# Patient Record
Sex: FEMALE | Race: WHITE | NOT HISPANIC OR LATINO | Employment: UNEMPLOYED | URBAN - METROPOLITAN AREA
[De-identification: names, ages, dates, MRNs, and addresses within clinical notes are randomized per-mention and may not be internally consistent; named-entity substitution may affect disease eponyms.]

---
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
## 2020-01-01 PROBLEM — F41.1 GENERALIZED ANXIETY DISORDER WITH PANIC ATTACKS: Status: ACTIVE | Noted: 2020-01-01

## 2020-01-01 PROBLEM — H10.32 ACUTE CONJUNCTIVITIS OF LEFT EYE: Status: ACTIVE | Noted: 2020-01-01

## 2020-01-01 PROBLEM — F41.0 GENERALIZED ANXIETY DISORDER WITH PANIC ATTACKS: Status: ACTIVE | Noted: 2020-01-01

## 2020-01-02 NOTE — PROGRESS NOTES
" Patient with limited prenatal care to date.  Did have anatomical survery @ 23 weeks gestation with antonio pregnancy, good growth noted, normal SETH, 3V cord, cephalic, posterior placenta with cervical length of 30.4 mm.   Female.    Patient states was seen at [facility] several months back for "fainting episode where eyes rolled back and my fist clenched". No seizure hx. Was given RX for Keppra till neuro follow up (not done) as "no one takes my medicaid".   Patient NEVER started medication as "never had seizures before".  Scheduled amb consult to Neurology for follow up.    No headaches, blurred vision, LOC since last episode.   Baby active, no lof, bleeding or cramping.  Reviewed s/s PTL and PRE-E. Having none.    Addressed anxiety/panic disorder and started on Vistaril 25 mg TID prn for same.     States had "flu" injection-  Considering TDAP next ov.  Will be breastfeeding and encouraged to download c0-effective odalys.    RTC 2 wks with 28 wk labs as well.    BENEFITS OF BREASTFEEDING   Protects baby from infection   Easy on their bellies (easy to digest)   Less chance of obesity, asthma, allergies   Lower risk of serious intestine infection   Helps control moms bleeding after baby is born   Burns calories (helps mom return to her weight before pregnancy)      RISKS OF FORMULA FEEDING   Changes the number of healthy bacteria in the babys belly   More difficult to digest and can make the baby constipated   Formula can become contaminated. This could make your baby sick.      SKIN TO SKIN   Helps baby stay warm   Helps babys breathing and heart beat   Helps mom and baby bond    ROOMING IN   Teaches mom how to take care of baby   Babies cry less when they stay with mom   Gets breastfeeding off to a good start    RISK OF FORMULA SUPPLEMENTATION      Can stretch babys tummy   Lose the infection protection that breastmilk offers   Can decrease moms milk supply      Download the Cofffective odalys to your " phone  Its free!

## 2020-01-09 ENCOUNTER — LAB VISIT (OUTPATIENT)
Dept: LAB | Facility: HOSPITAL | Age: 20
End: 2020-01-09
Attending: OBSTETRICS & GYNECOLOGY
Payer: MEDICAID

## 2020-01-09 ENCOUNTER — ROUTINE PRENATAL (OUTPATIENT)
Dept: OBSTETRICS AND GYNECOLOGY | Facility: CLINIC | Age: 20
End: 2020-01-09
Payer: MEDICAID

## 2020-01-09 VITALS — SYSTOLIC BLOOD PRESSURE: 116 MMHG | DIASTOLIC BLOOD PRESSURE: 64 MMHG | WEIGHT: 154.13 LBS | BODY MASS INDEX: 27.3 KG/M2

## 2020-01-09 DIAGNOSIS — R56.9 SEIZURE-LIKE ACTIVITY: ICD-10-CM

## 2020-01-09 DIAGNOSIS — F41.0 SEVERE ANXIETY WITH PANIC: ICD-10-CM
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
## 2020-01-09 DIAGNOSIS — Z3A.26 26 WEEKS GESTATION OF PREGNANCY: ICD-10-CM

## 2020-01-09 DIAGNOSIS — Z34.83 ENCOUNTER FOR SUPERVISION OF OTHER NORMAL PREGNANCY IN THIRD TRIMESTER: ICD-10-CM

## 2020-01-09 DIAGNOSIS — Z3A.28 28 WEEKS GESTATION OF PREGNANCY: Primary | ICD-10-CM

## 2020-01-09 PROBLEM — Z34.90 SUPERVISION OF NORMAL PREGNANCY: Status: ACTIVE | Noted: 2020-01-09

## 2020-01-09 LAB — GLUCOSE SERPL-MCNC: 111 MG/DL (ref 70–140)

## 2020-01-09 PROCEDURE — 36415 COLL VENOUS BLD VENIPUNCTURE: CPT | Mod: PO

## 2020-01-09 PROCEDURE — 99213 OFFICE O/P EST LOW 20 MIN: CPT | Mod: TH,S$PBB,, | Performed by: OBSTETRICS & GYNECOLOGY

## 2020-01-09 PROCEDURE — 99999 PR PBB SHADOW E&M-EST. PATIENT-LVL II: CPT | Mod: PBBFAC,,, | Performed by: OBSTETRICS & GYNECOLOGY

## 2020-01-09 PROCEDURE — 86592 SYPHILIS TEST NON-TREP QUAL: CPT

## 2020-01-09 PROCEDURE — 99213 PR OFFICE/OUTPT VISIT, EST, LEVL III, 20-29 MIN: ICD-10-PCS | Mod: TH,S$PBB,, | Performed by: OBSTETRICS & GYNECOLOGY

## 2020-01-09 PROCEDURE — 99999 PR PBB SHADOW E&M-EST. PATIENT-LVL II: ICD-10-PCS | Mod: PBBFAC,,, | Performed by: OBSTETRICS & GYNECOLOGY

## 2020-01-09 PROCEDURE — 86703 HIV-1/HIV-2 1 RESULT ANTBDY: CPT

## 2020-01-09 PROCEDURE — 82950 GLUCOSE TEST: CPT | Mod: PO

## 2020-01-09 PROCEDURE — 99212 OFFICE O/P EST SF 10 MIN: CPT | Mod: PBBFAC,TH,PO | Performed by: OBSTETRICS & GYNECOLOGY

## 2020-01-09 RX ORDER — CEPHALEXIN 500 MG/1
500 CAPSULE ORAL 4 TIMES DAILY
Qty: 40 CAPSULE | Refills: 0 | Status: SHIPPED | OUTPATIENT
Start: 2020-01-09 | End: 2020-02-06

## 2020-01-10 LAB
HIV 1+2 AB+HIV1 P24 AG SERPL QL IA: NEGATIVE
RPR SER QL: NORMAL

## 2020-01-10 NOTE — PROGRESS NOTES
"Still has not had neurology follow up appt.   No headaches, no further episodes of ? Seizure activity.   Taking Vistaril prn for anxiety and feels "is helping".  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 Excited about same.  28 wk labs in progress.    Good fm/fh.  Reassuring FKCs.  Reviewed s/s PTL/ Pre-E- having none.  Re-iterated childbirth classes available and option to tour birthing unit when able.     Has "insect bite" on outer aspect of left arm below elbow. Area reddened, not purulent, but slightly tender to touch. ~ 1 cm in size. Will start on Keflex 500 mg TID x 10 days. Use warm, moist compresses tid. Call if no improvement in 48 hours. Keep site covered.     RTC 2 wks.     "

## 2020-01-23 ENCOUNTER — ROUTINE PRENATAL (OUTPATIENT)
Dept: OBSTETRICS AND GYNECOLOGY | Facility: CLINIC | Age: 20
End: 2020-01-23
Payer: MEDICAID

## 2020-01-23 VITALS
DIASTOLIC BLOOD PRESSURE: 68 MMHG | WEIGHT: 158.94 LBS | SYSTOLIC BLOOD PRESSURE: 126 MMHG | BODY MASS INDEX: 28.16 KG/M2

## 2020-01-23 DIAGNOSIS — F41.0 PANIC ATTACK AS REACTION TO STRESS: ICD-10-CM

## 2020-01-23 DIAGNOSIS — F43.0 PANIC ATTACK AS REACTION TO STRESS: ICD-10-CM

## 2020-01-23 DIAGNOSIS — R56.9 SEIZURE-LIKE ACTIVITY: Primary | ICD-10-CM

## 2020-01-23 PROCEDURE — 99213 OFFICE O/P EST LOW 20 MIN: CPT | Mod: TH,S$PBB,, | Performed by: OBSTETRICS & GYNECOLOGY

## 2020-01-23 PROCEDURE — 99999 PR PBB SHADOW E&M-EST. PATIENT-LVL III: ICD-10-PCS | Mod: PBBFAC,,, | Performed by: OBSTETRICS & GYNECOLOGY

## 2020-01-23 PROCEDURE — 99213 PR OFFICE/OUTPT VISIT, EST, LEVL III, 20-29 MIN: ICD-10-PCS | Mod: TH,S$PBB,, | Performed by: OBSTETRICS & GYNECOLOGY

## 2020-01-23 PROCEDURE — 99213 OFFICE O/P EST LOW 20 MIN: CPT | Mod: PBBFAC,PO | Performed by: OBSTETRICS & GYNECOLOGY

## 2020-01-23 PROCEDURE — 99999 PR PBB SHADOW E&M-EST. PATIENT-LVL III: CPT | Mod: PBBFAC,,, | Performed by: OBSTETRICS & GYNECOLOGY

## 2020-01-23 RX ORDER — HYDROXYZINE PAMOATE 25 MG/1
50 CAPSULE ORAL 3 TIMES DAILY PRN
Qty: 90 CAPSULE | Refills: 2 | Status: ON HOLD | OUTPATIENT
Start: 2020-01-23 | End: 2020-04-06 | Stop reason: HOSPADM

## 2020-01-23 NOTE — PROGRESS NOTES
"Trying to get patient in with mother's own neurologist for f/u to ? Seizure noted at start of pregnancy.  No prior hx.  No further episodes noted. Placed referral order again.   Patient has started school and states stress level has increased. Feels like Vistaril not as effective anymore.  Dosage increased to 50 mg po TID. Does use just prn and feels overall "better" on medication.  Left elbow insect bite healed/dried. No drainage/tenderness to site.  Did complete all abx.   28 wk labs all normal and shared with patient.     Reviewed all s/s Pre-e/ PTL. Experiencing none.  Baby very active.  Good fm/fh.   No lof, bleeding or cramping.  Doing well.     Patient viewed [email or web address] video, Learn Your Baby and was provided with [employer] handouts: Baby Led Feeding and Rooming In. Discussed benefits of rooming-in 24 hours a day, benefits of cue-based feeding, the impact of feeding frequency on milk supply, and basic breastfeeding management. Encouraged patient to attend [facility]s Prenatal Breastfeeding Class and to download the tracx mobile odalys if she has not already done so. Patient verbalizes understanding.     RTC 2 wks.       "

## 2020-01-31 ENCOUNTER — PATIENT MESSAGE (OUTPATIENT)
Dept: OBSTETRICS AND GYNECOLOGY | Facility: CLINIC | Age: 20
End: 2020-01-31

## 2020-02-06 ENCOUNTER — ROUTINE PRENATAL (OUTPATIENT)
Dept: OBSTETRICS AND GYNECOLOGY | Facility: CLINIC | Age: 20
End: 2020-02-06
Payer: MEDICAID

## 2020-02-06 ENCOUNTER — PATIENT MESSAGE (OUTPATIENT)
Dept: OBSTETRICS AND GYNECOLOGY | Facility: CLINIC | Age: 20
End: 2020-02-06

## 2020-02-06 VITALS
WEIGHT: 162.69 LBS | DIASTOLIC BLOOD PRESSURE: 76 MMHG | BODY MASS INDEX: 28.82 KG/M2 | SYSTOLIC BLOOD PRESSURE: 132 MMHG

## 2020-02-06 DIAGNOSIS — Z3A.32 32 WEEKS GESTATION OF PREGNANCY: Primary | ICD-10-CM

## 2020-02-06 DIAGNOSIS — O99.351 SEIZURE DISORDER IN PREGNANCY, ANTEPARTUM, FIRST TRIMESTER: ICD-10-CM

## 2020-02-06 DIAGNOSIS — J02.9 ACUTE SORE THROAT: ICD-10-CM

## 2020-02-06 DIAGNOSIS — Z86.59 HISTORY OF ANXIETY DISORDER: ICD-10-CM

## 2020-02-06 DIAGNOSIS — G40.909 SEIZURE DISORDER IN PREGNANCY, ANTEPARTUM, FIRST TRIMESTER: ICD-10-CM

## 2020-02-06 DIAGNOSIS — J06.9 UPPER RESPIRATORY TRACT INFECTION, UNSPECIFIED TYPE: ICD-10-CM

## 2020-02-06 DIAGNOSIS — Z34.83 ENCOUNTER FOR SUPERVISION OF OTHER NORMAL PREGNANCY IN THIRD TRIMESTER: ICD-10-CM

## 2020-02-06 PROCEDURE — 99214 PR OFFICE/OUTPT VISIT, EST, LEVL IV, 30-39 MIN: ICD-10-PCS | Mod: TH,S$PBB,, | Performed by: OBSTETRICS & GYNECOLOGY

## 2020-02-06 PROCEDURE — 99213 OFFICE O/P EST LOW 20 MIN: CPT | Mod: PBBFAC,TH,PO | Performed by: OBSTETRICS & GYNECOLOGY

## 2020-02-06 PROCEDURE — 99999 PR PBB SHADOW E&M-EST. PATIENT-LVL III: CPT | Mod: PBBFAC,,, | Performed by: OBSTETRICS & GYNECOLOGY

## 2020-02-06 PROCEDURE — 99214 OFFICE O/P EST MOD 30 MIN: CPT | Mod: TH,S$PBB,, | Performed by: OBSTETRICS & GYNECOLOGY

## 2020-02-06 PROCEDURE — 99999 PR PBB SHADOW E&M-EST. PATIENT-LVL III: ICD-10-PCS | Mod: PBBFAC,,, | Performed by: OBSTETRICS & GYNECOLOGY

## 2020-02-07 ENCOUNTER — PATIENT MESSAGE (OUTPATIENT)
Dept: OBSTETRICS AND GYNECOLOGY | Facility: CLINIC | Age: 20
End: 2020-02-07

## 2020-02-07 DIAGNOSIS — R05.9 COUGH: Primary | ICD-10-CM

## 2020-02-07 RX ORDER — BENZONATATE 100 MG/1
100 CAPSULE ORAL 3 TIMES DAILY PRN
Qty: 30 CAPSULE | Refills: 0 | Status: SHIPPED | OUTPATIENT
Start: 2020-02-07 | End: 2020-02-17

## 2020-02-08 NOTE — PROGRESS NOTES
"States still doing well on Vistaril and has been very helpful in managing anxiety/panic attacks.   Ambulatory consult to Neuro with appt still pending.   No further episodes of ? Seizure at start of pregnancy.   Good fm/fh.  4 # wt gain. TWG 37 #.   Minimal edema.   No headaches, blurred vision or any  ctx's.   EFW by Leopolds' 4#, but possible breech.   US next ov to confirm presentation and check growth.   Is having URI s/s. No fever. Congestion with persistent cough. Chest is CTX throughout.   Feels aches/ headache/ c/o "severe sore throat". Jack-pharynx mildly pink with no exudate.  Nasal and throat swab. Strep/ Flu swabs in office are negative.   Will send in Tessalon Perles for cough.  Continue OTC tylenol as directed.  RTC 2 wks.       "

## 2020-02-17 ENCOUNTER — PATIENT MESSAGE (OUTPATIENT)
Dept: OBSTETRICS AND GYNECOLOGY | Facility: CLINIC | Age: 20
End: 2020-02-17

## 2020-02-18 ENCOUNTER — HOSPITAL ENCOUNTER (INPATIENT)
Facility: HOSPITAL | Age: 20
LOS: 3 days | Discharge: HOME | DRG: 831 | End: 2020-02-21
Attending: FAMILY MEDICINE | Admitting: OBSTETRICS & GYNECOLOGY
Payer: MEDICAID

## 2020-02-18 DIAGNOSIS — N30.01 ACUTE CYSTITIS WITH HEMATURIA: ICD-10-CM

## 2020-02-18 DIAGNOSIS — R10.2 PELVIC PAIN AFFECTING PREGNANCY IN THIRD TRIMESTER, ANTEPARTUM: ICD-10-CM

## 2020-02-18 DIAGNOSIS — O36.8131 DECREASED FETAL MOVEMENTS IN THIRD TRIMESTER, FETUS 1 OF MULTIPLE GESTATION: ICD-10-CM

## 2020-02-18 DIAGNOSIS — R10.9 RIGHT FLANK PAIN: ICD-10-CM

## 2020-02-18 DIAGNOSIS — R00.0 TACHYCARDIA: ICD-10-CM

## 2020-02-18 DIAGNOSIS — A41.9 SEPSIS, DUE TO UNSPECIFIED ORGANISM, UNSPECIFIED WHETHER ACUTE ORGAN DYSFUNCTION PRESENT: Primary | ICD-10-CM

## 2020-02-18 DIAGNOSIS — R65.10 SIRS (SYSTEMIC INFLAMMATORY RESPONSE SYNDROME): ICD-10-CM

## 2020-02-18 DIAGNOSIS — O26.893 PELVIC PAIN AFFECTING PREGNANCY IN THIRD TRIMESTER, ANTEPARTUM: ICD-10-CM

## 2020-02-18 DIAGNOSIS — R07.9 CHEST PAIN: ICD-10-CM

## 2020-02-18 DIAGNOSIS — N12 PYELONEPHRITIS: ICD-10-CM

## 2020-02-18 PROBLEM — H10.32 ACUTE CONJUNCTIVITIS OF LEFT EYE: Status: RESOLVED | Noted: 2020-01-01 | Resolved: 2020-02-18

## 2020-02-18 PROBLEM — O36.8190 DECREASED FETAL MOVEMENT: Status: ACTIVE | Noted: 2020-02-18

## 2020-02-18 PROBLEM — O36.8190 DECREASED FETAL MOVEMENT: Status: RESOLVED | Noted: 2020-02-18 | Resolved: 2020-02-18

## 2020-02-18 PROBLEM — O47.00 PREMATURE UTERINE CONTRACTIONS: Status: ACTIVE | Noted: 2020-02-18

## 2020-02-18 LAB
ALBUMIN SERPL BCP-MCNC: 2.7 G/DL (ref 3.5–5.2)
ALP SERPL-CCNC: 121 U/L (ref 55–135)
ALT SERPL W/O P-5'-P-CCNC: 15 U/L (ref 10–44)
ANION GAP SERPL CALC-SCNC: 10 MMOL/L (ref 8–16)
APTT BLDCRRT: 25.2 SEC (ref 21–32)
AST SERPL-CCNC: 18 U/L (ref 10–40)
BACTERIA #/AREA URNS AUTO: ABNORMAL /HPF
BASOPHILS # BLD AUTO: 0.02 K/UL (ref 0–0.2)
BASOPHILS NFR BLD: 0.3 % (ref 0–1.9)
BILIRUB SERPL-MCNC: 0.5 MG/DL (ref 0.1–1)
BILIRUB UR QL STRIP: NEGATIVE
BUN SERPL-MCNC: 6 MG/DL (ref 6–20)
CALCIUM SERPL-MCNC: 8.8 MG/DL (ref 8.7–10.5)
CHLORIDE SERPL-SCNC: 105 MMOL/L (ref 95–110)
CLARITY UR REFRACT.AUTO: ABNORMAL
CO2 SERPL-SCNC: 21 MMOL/L (ref 23–29)
COLOR UR AUTO: YELLOW
CREAT SERPL-MCNC: 0.7 MG/DL (ref 0.5–1.4)
DIFFERENTIAL METHOD: ABNORMAL
EOSINOPHIL # BLD AUTO: 0.1 K/UL (ref 0–0.5)
EOSINOPHIL NFR BLD: 1.3 % (ref 0–8)
ERYTHROCYTE [DISTWIDTH] IN BLOOD BY AUTOMATED COUNT: 12.3 % (ref 11.5–14.5)
EST. GFR  (AFRICAN AMERICAN): >60 ML/MIN/1.73 M^2
EST. GFR  (NON AFRICAN AMERICAN): >60 ML/MIN/1.73 M^2
GLUCOSE SERPL-MCNC: 105 MG/DL (ref 70–110)
GLUCOSE UR QL STRIP: NEGATIVE
HCT VFR BLD AUTO: 28.8 % (ref 37–48.5)
HGB BLD-MCNC: 9.6 G/DL (ref 12–16)
HGB UR QL STRIP: ABNORMAL
IMM GRANULOCYTES # BLD AUTO: 0.03 K/UL (ref 0–0.04)
IMM GRANULOCYTES NFR BLD AUTO: 0.4 % (ref 0–0.5)
INR PPP: 1 (ref 0.8–1.2)
KETONES UR QL STRIP: NEGATIVE
LACTATE SERPL-SCNC: 2.1 MMOL/L (ref 0.5–2.2)
LEUKOCYTE ESTERASE UR QL STRIP: ABNORMAL
LIPASE SERPL-CCNC: 19 U/L (ref 4–60)
LYMPHOCYTES # BLD AUTO: 0.9 K/UL (ref 1–4.8)
LYMPHOCYTES NFR BLD: 12.3 % (ref 18–48)
MCH RBC QN AUTO: 28.9 PG (ref 27–31)
MCHC RBC AUTO-ENTMCNC: 33.3 G/DL (ref 32–36)
MCV RBC AUTO: 87 FL (ref 82–98)
MICROSCOPIC COMMENT: ABNORMAL
MONOCYTES # BLD AUTO: 0.2 K/UL (ref 0.3–1)
MONOCYTES NFR BLD: 2.1 % (ref 4–15)
NEUTROPHILS # BLD AUTO: 6.3 K/UL (ref 1.8–7.7)
NEUTROPHILS NFR BLD: 83.6 % (ref 38–73)
NITRITE UR QL STRIP: POSITIVE
NRBC BLD-RTO: 0 /100 WBC
PH UR STRIP: 7 [PH] (ref 5–8)
PLATELET # BLD AUTO: 247 K/UL (ref 150–350)
PMV BLD AUTO: 11.1 FL (ref 9.2–12.9)
POTASSIUM SERPL-SCNC: 3.8 MMOL/L (ref 3.5–5.1)
PROCALCITONIN SERPL IA-MCNC: 0.59 NG/ML
PROT SERPL-MCNC: 6.9 G/DL (ref 6–8.4)
PROT UR QL STRIP: ABNORMAL
PROTHROMBIN TIME: 10 SEC (ref 9–12.5)
RBC # BLD AUTO: 3.32 M/UL (ref 4–5.4)
RBC #/AREA URNS AUTO: 1 /HPF (ref 0–4)
SODIUM SERPL-SCNC: 136 MMOL/L (ref 136–145)
SP GR UR STRIP: 1.01 (ref 1–1.03)
SQUAMOUS #/AREA URNS AUTO: 0 /HPF
URN SPEC COLLECT METH UR: ABNORMAL
UROBILINOGEN UR STRIP-ACNC: ABNORMAL EU/DL
WBC # BLD AUTO: 7.58 K/UL (ref 3.9–12.7)
WBC #/AREA URNS AUTO: >100 /HPF (ref 0–5)

## 2020-02-18 PROCEDURE — G0378 HOSPITAL OBSERVATION PER HR: HCPCS | Mod: ER

## 2020-02-18 PROCEDURE — 87186 SC STD MICRODIL/AGAR DIL: CPT

## 2020-02-18 PROCEDURE — 83690 ASSAY OF LIPASE: CPT | Mod: PO,ER

## 2020-02-18 PROCEDURE — 84145 PROCALCITONIN (PCT): CPT | Mod: PO,ER

## 2020-02-18 PROCEDURE — 11000001 HC ACUTE MED/SURG PRIVATE ROOM

## 2020-02-18 PROCEDURE — 63600175 PHARM REV CODE 636 W HCPCS: Performed by: OBSTETRICS & GYNECOLOGY

## 2020-02-18 PROCEDURE — 96365 THER/PROPH/DIAG IV INF INIT: CPT

## 2020-02-18 PROCEDURE — 96360 HYDRATION IV INFUSION INIT: CPT

## 2020-02-18 PROCEDURE — 99291 CRITICAL CARE FIRST HOUR: CPT | Mod: 25,ER

## 2020-02-18 PROCEDURE — 96372 THER/PROPH/DIAG INJ SC/IM: CPT

## 2020-02-18 PROCEDURE — 85730 THROMBOPLASTIN TIME PARTIAL: CPT | Mod: PO,ER

## 2020-02-18 PROCEDURE — 93005 ELECTROCARDIOGRAM TRACING: CPT | Mod: PO,ER

## 2020-02-18 PROCEDURE — 59025 FETAL NON-STRESS TEST: CPT

## 2020-02-18 PROCEDURE — 87088 URINE BACTERIA CULTURE: CPT

## 2020-02-18 PROCEDURE — 80053 COMPREHEN METABOLIC PANEL: CPT | Mod: PO,ER

## 2020-02-18 PROCEDURE — 96374 THER/PROPH/DIAG INJ IV PUSH: CPT | Mod: ER

## 2020-02-18 PROCEDURE — 87077 CULTURE AEROBIC IDENTIFY: CPT

## 2020-02-18 PROCEDURE — 63600175 PHARM REV CODE 636 W HCPCS: Mod: ER | Performed by: FAMILY MEDICINE

## 2020-02-18 PROCEDURE — 81000 URINALYSIS NONAUTO W/SCOPE: CPT | Mod: PO,ER

## 2020-02-18 PROCEDURE — 25000003 PHARM REV CODE 250: Mod: ER | Performed by: FAMILY MEDICINE

## 2020-02-18 PROCEDURE — 63600175 PHARM REV CODE 636 W HCPCS: Performed by: ADVANCED PRACTICE MIDWIFE

## 2020-02-18 PROCEDURE — 87086 URINE CULTURE/COLONY COUNT: CPT

## 2020-02-18 PROCEDURE — 96361 HYDRATE IV INFUSION ADD-ON: CPT | Mod: ER

## 2020-02-18 PROCEDURE — 83605 ASSAY OF LACTIC ACID: CPT | Mod: PO,ER

## 2020-02-18 PROCEDURE — 85610 PROTHROMBIN TIME: CPT | Mod: PO,ER

## 2020-02-18 PROCEDURE — 93010 EKG 12-LEAD: ICD-10-PCS | Mod: ,,, | Performed by: INTERNAL MEDICINE

## 2020-02-18 PROCEDURE — 99211 OFF/OP EST MAY X REQ PHY/QHP: CPT | Mod: 25

## 2020-02-18 PROCEDURE — 96361 HYDRATE IV INFUSION ADD-ON: CPT

## 2020-02-18 PROCEDURE — 85025 COMPLETE CBC W/AUTO DIFF WBC: CPT | Mod: PO,ER

## 2020-02-18 PROCEDURE — 87081 CULTURE SCREEN ONLY: CPT

## 2020-02-18 PROCEDURE — 87040 BLOOD CULTURE FOR BACTERIA: CPT | Mod: 59

## 2020-02-18 PROCEDURE — 93010 ELECTROCARDIOGRAM REPORT: CPT | Mod: ,,, | Performed by: INTERNAL MEDICINE

## 2020-02-18 PROCEDURE — 25000003 PHARM REV CODE 250: Performed by: ADVANCED PRACTICE MIDWIFE

## 2020-02-18 RX ORDER — ACETAMINOPHEN 500 MG
500 TABLET ORAL EVERY 6 HOURS PRN
Status: DISCONTINUED | OUTPATIENT
Start: 2020-02-18 | End: 2020-02-18 | Stop reason: SDUPTHER

## 2020-02-18 RX ORDER — ACETAMINOPHEN 325 MG/1
650 TABLET ORAL EVERY 6 HOURS PRN
Status: DISCONTINUED | OUTPATIENT
Start: 2020-02-18 | End: 2020-02-21 | Stop reason: HOSPADM

## 2020-02-18 RX ORDER — ONDANSETRON 8 MG/1
8 TABLET, ORALLY DISINTEGRATING ORAL EVERY 8 HOURS PRN
Status: DISCONTINUED | OUTPATIENT
Start: 2020-02-18 | End: 2020-02-21 | Stop reason: HOSPADM

## 2020-02-18 RX ORDER — HYDROCODONE BITARTRATE AND ACETAMINOPHEN 7.5; 325 MG/1; MG/1
1 TABLET ORAL EVERY 6 HOURS PRN
Status: DISCONTINUED | OUTPATIENT
Start: 2020-02-18 | End: 2020-02-19

## 2020-02-18 RX ORDER — ONDANSETRON 2 MG/ML
4 INJECTION INTRAMUSCULAR; INTRAVENOUS
Status: COMPLETED | OUTPATIENT
Start: 2020-02-18 | End: 2020-02-18

## 2020-02-18 RX ORDER — BUTALBITAL, ACETAMINOPHEN AND CAFFEINE 50; 325; 40 MG/1; MG/1; MG/1
1 TABLET ORAL EVERY 4 HOURS PRN
Status: DISCONTINUED | OUTPATIENT
Start: 2020-02-18 | End: 2020-02-21 | Stop reason: HOSPADM

## 2020-02-18 RX ORDER — SODIUM CHLORIDE, SODIUM LACTATE, POTASSIUM CHLORIDE, CALCIUM CHLORIDE 600; 310; 30; 20 MG/100ML; MG/100ML; MG/100ML; MG/100ML
INJECTION, SOLUTION INTRAVENOUS CONTINUOUS
Status: DISCONTINUED | OUTPATIENT
Start: 2020-02-18 | End: 2020-02-21 | Stop reason: HOSPADM

## 2020-02-18 RX ORDER — MORPHINE SULFATE 10 MG/ML
10 INJECTION INTRAMUSCULAR; INTRAVENOUS; SUBCUTANEOUS ONCE
Status: COMPLETED | OUTPATIENT
Start: 2020-02-18 | End: 2020-02-18

## 2020-02-18 RX ORDER — HYDROXYZINE HYDROCHLORIDE 25 MG/ML
25 INJECTION, SOLUTION INTRAMUSCULAR ONCE
Status: COMPLETED | OUTPATIENT
Start: 2020-02-18 | End: 2020-02-18

## 2020-02-18 RX ORDER — HYDROXYZINE PAMOATE 25 MG/1
50 CAPSULE ORAL 3 TIMES DAILY
Status: DISCONTINUED | OUTPATIENT
Start: 2020-02-18 | End: 2020-02-21 | Stop reason: HOSPADM

## 2020-02-18 RX ORDER — ACETAMINOPHEN 325 MG/1
650 TABLET ORAL
Status: COMPLETED | OUTPATIENT
Start: 2020-02-18 | End: 2020-02-18

## 2020-02-18 RX ADMIN — ACETAMINOPHEN 650 MG: 325 TABLET ORAL at 10:02

## 2020-02-18 RX ADMIN — ACETAMINOPHEN 650 MG: 325 TABLET ORAL at 06:02

## 2020-02-18 RX ADMIN — HYDROXYZINE PAMOATE 50 MG: 25 CAPSULE ORAL at 06:02

## 2020-02-18 RX ADMIN — SODIUM CHLORIDE, SODIUM LACTATE, POTASSIUM CHLORIDE, AND CALCIUM CHLORIDE 1000 ML: .6; .31; .03; .02 INJECTION, SOLUTION INTRAVENOUS at 04:02

## 2020-02-18 RX ADMIN — BUTALBITAL, ACETAMINOPHEN AND CAFFEINE 1 TABLET: 50; 325; 40 TABLET ORAL at 08:02

## 2020-02-18 RX ADMIN — ONDANSETRON 4 MG: 2 INJECTION INTRAMUSCULAR; INTRAVENOUS at 04:02

## 2020-02-18 RX ADMIN — MORPHINE SULFATE 10 MG: 10 INJECTION, SOLUTION INTRAMUSCULAR; INTRAVENOUS at 07:02

## 2020-02-18 RX ADMIN — HYDROXYZINE HYDROCHLORIDE 25 MG: 25 INJECTION, SOLUTION INTRAMUSCULAR at 07:02

## 2020-02-18 RX ADMIN — SODIUM CHLORIDE, SODIUM LACTATE, POTASSIUM CHLORIDE, AND CALCIUM CHLORIDE: .6; .31; .03; .02 INJECTION, SOLUTION INTRAVENOUS at 06:02

## 2020-02-18 RX ADMIN — ACETAMINOPHEN 650 MG: 325 TABLET ORAL at 05:02

## 2020-02-18 RX ADMIN — SODIUM CHLORIDE, SODIUM LACTATE, POTASSIUM CHLORIDE, AND CALCIUM CHLORIDE: .6; .31; .03; .02 INJECTION, SOLUTION INTRAVENOUS at 10:02

## 2020-02-18 RX ADMIN — SODIUM CHLORIDE, SODIUM LACTATE, POTASSIUM CHLORIDE, AND CALCIUM CHLORIDE 500 ML: .6; .31; .03; .02 INJECTION, SOLUTION INTRAVENOUS at 05:02

## 2020-02-18 RX ADMIN — SODIUM CHLORIDE 1000 ML: 0.9 INJECTION, SOLUTION INTRAVENOUS at 05:02

## 2020-02-18 RX ADMIN — CEFTRIAXONE SODIUM 1 G: 1 INJECTION, POWDER, FOR SOLUTION INTRAMUSCULAR; INTRAVENOUS at 10:02

## 2020-02-18 RX ADMIN — PIPERACILLIN AND TAZOBACTAM 4.5 G: 4; .5 INJECTION, POWDER, LYOPHILIZED, FOR SOLUTION INTRAVENOUS; PARENTERAL at 05:02

## 2020-02-18 RX ADMIN — SODIUM CHLORIDE, SODIUM LACTATE, POTASSIUM CHLORIDE, AND CALCIUM CHLORIDE: .6; .31; .03; .02 INJECTION, SOLUTION INTRAVENOUS at 05:02

## 2020-02-18 RX ADMIN — SODIUM CHLORIDE, SODIUM LACTATE, POTASSIUM CHLORIDE, AND CALCIUM CHLORIDE 1000 ML: .6; .31; .03; .02 INJECTION, SOLUTION INTRAVENOUS at 03:02

## 2020-02-18 NOTE — ED TRIAGE NOTES
Patient presents to ED via POV with c/o RLQ pain, vomiting and lethargy x1 day.  States due date 03/31/2020

## 2020-02-18 NOTE — ED PROVIDER NOTES
"Encounter Date: 2020       History     Chief Complaint   Patient presents with    Emesis During Pregnancy    Abdominal Pain     RLQ     This is a 19-year-old  female  at 34 weeks gestation who presents emergency department for generalized fatigue/weakness, hematemesis, and bilateral pelvic and right flank pain. Patient states that she has been vomiting continuously for the past 2-3 days.  However prior to her arrival, family member at bedside states that she had "a lot of blood" were which she was not able to quantify.  Patient states that she has been having right-sided/flank pain since yesterday, for which she had called her midwife for further recommendation.  She was told that it was round ligament pain and recommended supportive management.  However patient states that it had gotten worse throughout the day as well as all day today and it spread to bilateral pelvic region.  Patient denies any dysuria, urgency, hematuria.  Denies history of kidney stones.  Denies any vaginal bleeding, discharge, loss of fluid/gush of fluid.  Patient does state however that she was not able to feel baby move for the past 45-60 minutes.  Denies any complications throughout prenatal care.  Patient denies diarrhea or constipation.  Denies fevers chills  Upon arrival to the emergency department, patient was unable to get out of the wheelchair and had to be assisted into the ER and into ED bed.  Patient was responsive to verbal stimuli and able to answer questions but was found to be tachycardic in the 140s.        Review of patient's allergies indicates:  No Known Allergies  Past Medical History:   Diagnosis Date    Seizure     SVT (supraventricular tachycardia)      History reviewed. No pertinent surgical history.  Family History   Problem Relation Age of Onset    Breast cancer Neg Hx     Colon cancer Neg Hx     Ovarian cancer Neg Hx      Social History     Tobacco Use    Smoking status: Never Smoker    " Smokeless tobacco: Never Used   Substance Use Topics    Alcohol use: No     Frequency: Never    Drug use: No     Review of Systems   Constitutional: Positive for activity change and fatigue. Negative for chills, diaphoresis and fever.   HENT: Negative for congestion, postnasal drip, rhinorrhea, sneezing and sore throat.    Eyes: Negative for visual disturbance.   Respiratory: Negative for cough, chest tightness and shortness of breath.    Cardiovascular: Negative for chest pain, palpitations and leg swelling.   Gastrointestinal: Positive for abdominal pain, nausea and vomiting. Negative for constipation and diarrhea.        Hematemesis  Decreased fetal movement   Genitourinary: Positive for flank pain and pelvic pain. Negative for dysuria, frequency, hematuria, urgency, vaginal bleeding and vaginal discharge.   Musculoskeletal: Negative for back pain, gait problem and myalgias.   Skin: Negative for rash.   Neurological: Positive for weakness. Negative for light-headedness, numbness and headaches.   Hematological: Does not bruise/bleed easily.   All other systems reviewed and are negative.      Physical Exam     Initial Vitals [02/18/20 0335]   BP Pulse Resp Temp SpO2   (!) 108/59 (!) 143 (!) 26 99.2 °F (37.3 °C) 100 %      MAP       --         Vitals:    02/18/20 0431 02/18/20 0446 02/18/20 0501 02/18/20 0511   BP: (!) 96/51 (!) 94/52 (!) 86/46 (!) 102/50   Pulse: 103 97 99 102   Resp: (!) 26 (!) 21 (!) 23 (!) 27   Temp:       TempSrc:       SpO2: 98% 98% 98% 100%   Weight:       Height:        02/18/20 0516   BP: (!) 107/54   Pulse: 100   Resp: (!) 27   Temp:    TempSrc:    SpO2: 100%   Weight:    Height:        Physical Exam    Nursing note and vitals reviewed.  Constitutional: She is not diaphoretic. She appears distressed.   Very weak, drowsy   HENT:   Head: Normocephalic.   Right Ear: External ear normal.   Left Ear: External ear normal.   Mouth/Throat: Oropharynx is clear and moist.   Dry mucous membranes    Eyes: Conjunctivae and EOM are normal. Pupils are equal, round, and reactive to light.   Neck: Normal range of motion. Neck supple. No JVD present.   Cardiovascular: Regular rhythm and normal heart sounds.   Tachycardic   Pulmonary/Chest: Breath sounds normal. No respiratory distress. She has no wheezes.   Abdominal: Soft. Bowel sounds are normal. She exhibits no distension. There is tenderness. There is no rebound and no guarding.   Tenderness to palpation along right side of abdomen as well as along right round ligament.  Difficult to distinguish between abdominal versus uterine pain   Genitourinary:   Genitourinary Comments: Gravid uterus  Negative CVA tenderness bilaterally, negative suprapubic tenderness  Cervix:  Closed/thick/high   Musculoskeletal: Normal range of motion. She exhibits no edema or tenderness.   Neurological: She is alert and oriented to person, place, and time. She has normal strength. No cranial nerve deficit or sensory deficit. GCS score is 15. GCS eye subscore is 4. GCS verbal subscore is 5. GCS motor subscore is 6.   Skin: Skin is warm and dry. Capillary refill takes 2 to 3 seconds. No rash noted.   Psychiatric: She has a normal mood and affect. Thought content normal.         ED Course   Critical Care  Date/Time: 2/18/2020 5:53 AM  Performed by: Naty Hall MD  Authorized by: Naty Hall MD   Direct patient critical care time: 15 minutes  Additional history critical care time: 5 minutes  Ordering / reviewing critical care time: 5 minutes  Documentation critical care time: 5 minutes  Consulting other physicians critical care time: 5 minutes  Consult with family critical care time: 5 minutes  Other critical care time: 5 minutes  Total critical care time (exclusive of procedural time) : 45 minutes  Critical care time was exclusive of separately billable procedures and treating other patients and teaching time.  Critical care was necessary to treat or prevent imminent or  life-threatening deterioration of the following conditions: sepsis.  Critical care was time spent personally by me on the following activities: blood draw for specimens, development of treatment plan with patient or surrogate, discussions with consultants, interpretation of cardiac output measurements, evaluation of patient's response to treatment, examination of patient, obtaining history from patient or surrogate, ordering and performing treatments and interventions, ordering and review of laboratory studies, ordering and review of radiographic studies, pulse oximetry, re-evaluation of patient's condition and review of old charts.        Labs Reviewed   CBC W/ AUTO DIFFERENTIAL - Abnormal; Notable for the following components:       Result Value    RBC 3.32 (*)     Hemoglobin 9.6 (*)     Hematocrit 28.8 (*)     Lymph # 0.9 (*)     Mono # 0.2 (*)     Gran% 83.6 (*)     Lymph% 12.3 (*)     Mono% 2.1 (*)     All other components within normal limits   COMPREHENSIVE METABOLIC PANEL - Abnormal; Notable for the following components:    CO2 21 (*)     Albumin 2.7 (*)     All other components within normal limits   URINALYSIS, REFLEX TO URINE CULTURE - Abnormal; Notable for the following components:    Appearance, UA Hazy (*)     Protein, UA Trace (*)     Occult Blood UA 1+ (*)     Nitrite, UA Positive (*)     Urobilinogen, UA 2.0-3.0 (*)     Leukocytes, UA 3+ (*)     All other components within normal limits    Narrative:     Preferred Collection Type->Urine, Clean Catch   PROCALCITONIN - Abnormal; Notable for the following components:    Procalcitonin 0.59 (*)     All other components within normal limits   URINALYSIS MICROSCOPIC - Abnormal; Notable for the following components:    WBC, UA >100 (*)     Bacteria Many (*)     All other components within normal limits    Narrative:     Preferred Collection Type->Urine, Clean Catch   CULTURE, BLOOD   CULTURE, BLOOD   CULTURE, URINE   LIPASE   APTT   PROTIME-INR   LACTIC  ACID, PLASMA           Results for orders placed or performed during the hospital encounter of 02/18/20   CBC W/ AUTO DIFFERENTIAL   Result Value Ref Range    WBC 7.58 3.90 - 12.70 K/uL    RBC 3.32 (L) 4.00 - 5.40 M/uL    Hemoglobin 9.6 (L) 12.0 - 16.0 g/dL    Hematocrit 28.8 (L) 37.0 - 48.5 %    Mean Corpuscular Volume 87 82 - 98 fL    Mean Corpuscular Hemoglobin 28.9 27.0 - 31.0 pg    Mean Corpuscular Hemoglobin Conc 33.3 32.0 - 36.0 g/dL    RDW 12.3 11.5 - 14.5 %    Platelets 247 150 - 350 K/uL    MPV 11.1 9.2 - 12.9 fL    Immature Granulocytes 0.4 0.0 - 0.5 %    Gran # (ANC) 6.3 1.8 - 7.7 K/uL    Immature Grans (Abs) 0.03 0.00 - 0.04 K/uL    Lymph # 0.9 (L) 1.0 - 4.8 K/uL    Mono # 0.2 (L) 0.3 - 1.0 K/uL    Eos # 0.1 0.0 - 0.5 K/uL    Baso # 0.02 0.00 - 0.20 K/uL    nRBC 0 0 /100 WBC    Gran% 83.6 (H) 38.0 - 73.0 %    Lymph% 12.3 (L) 18.0 - 48.0 %    Mono% 2.1 (L) 4.0 - 15.0 %    Eosinophil% 1.3 0.0 - 8.0 %    Basophil% 0.3 0.0 - 1.9 %    Differential Method Automated    Comp. Metabolic Panel   Result Value Ref Range    Sodium 136 136 - 145 mmol/L    Potassium 3.8 3.5 - 5.1 mmol/L    Chloride 105 95 - 110 mmol/L    CO2 21 (L) 23 - 29 mmol/L    Glucose 105 70 - 110 mg/dL    BUN, Bld 6 6 - 20 mg/dL    Creatinine 0.7 0.5 - 1.4 mg/dL    Calcium 8.8 8.7 - 10.5 mg/dL    Total Protein 6.9 6.0 - 8.4 g/dL    Albumin 2.7 (L) 3.5 - 5.2 g/dL    Total Bilirubin 0.5 0.1 - 1.0 mg/dL    Alkaline Phosphatase 121 55 - 135 U/L    AST 18 10 - 40 U/L    ALT 15 10 - 44 U/L    Anion Gap 10 8 - 16 mmol/L    eGFR if African American >60.0 >60 mL/min/1.73 m^2    eGFR if non African American >60.0 >60 mL/min/1.73 m^2   Lipase   Result Value Ref Range    Lipase 19 4 - 60 U/L   Urinalysis, Reflex to Urine Culture Urine, Clean Catch   Result Value Ref Range    Specimen UA Urine, Catheterized     Color, UA Yellow Yellow, Straw, Ayla    Appearance, UA Hazy (A) Clear    pH, UA 7.0 5.0 - 8.0    Specific Gravity, UA 1.010 1.005 - 1.030     Protein, UA Trace (A) Negative    Glucose, UA Negative Negative    Ketones, UA Negative Negative    Bilirubin (UA) Negative Negative    Occult Blood UA 1+ (A) Negative    Nitrite, UA Positive (A) Negative    Urobilinogen, UA 2.0-3.0 (A) <2.0 EU/dL    Leukocytes, UA 3+ (A) Negative   APTT   Result Value Ref Range    aPTT 25.2 21.0 - 32.0 sec   Protime-INR   Result Value Ref Range    Prothrombin Time 10.0 9.0 - 12.5 sec    INR 1.0 0.8 - 1.2   Lactic acid, plasma   Result Value Ref Range    Lactate (Lactic Acid) 2.1 0.5 - 2.2 mmol/L   Procalcitonin   Result Value Ref Range    Procalcitonin 0.59 (H) <0.25 ng/mL   Urinalysis Microscopic   Result Value Ref Range    RBC, UA 1 0 - 4 /hpf    WBC, UA >100 (H) 0 - 5 /hpf    Bacteria Many (A) None-Occ /hpf    Squam Epithel, UA 0 /hpf    Microscopic Comment SEE COMMENT        Imaging Results    None                          Orders Placed This Encounter   Procedures    Critical Care    Blood culture #1 **CANNOT BE ORDERED STAT**    Blood culture #2 **CANNOT BE ORDERED STAT**    Urine culture    CBC W/ AUTO DIFFERENTIAL    Comp. Metabolic Panel    Lipase    Urinalysis, Reflex to Urine Culture Urine, Clean Catch    APTT    Protime-INR    Lactic acid, plasma    Procalcitonin    Urinalysis Microscopic    Diet NPO    Vital signs    Setup Pelvic Tray    Nursing communication    EKG 12-lead    Insert peripheral IV    PFC Facilitated Transportation Request    ED to L&D Observation           Medications   lactated ringers bolus 500 mL (500 mLs Intravenous New Bag 2/18/20 9562)   lactated ringers bolus 1,000 mL (0 mLs Intravenous Stopped 2/18/20 1526)   ondansetron injection 4 mg (4 mg Intravenous Given 2/18/20 1198)   lactated ringers bolus 1,000 mL (0 mLs Intravenous Stopped 2/18/20 9211)   piperacillin-tazobactam 4.5 g in dextrose 5 % 100 mL IVPB (ready to mix system) (4.5 g Intravenous New Bag 2/18/20 4611)   acetaminophen tablet 650 mg (650 mg Oral Given 2/18/20  0504)   sodium chloride 0.9% bolus 1,000 mL (1,000 mLs Intravenous New Bag 2/18/20 0511)       ED Course as of Feb 18 0554 Tue Feb 18, 2020   0341 Fetal heart tone between 196-202    [NAEEM]   0400 I have discussed case with Dr. Moreira, OBGYN on-call, and discussed with her that I have initiated workup on the patient by drawing blood work, which is still pending, and started patient on lactated Ringer/nausea control and have requested admission to the L&D. She was made aware that results are not back and I have offered any other further management.  And response,  Dr. Moreira accepted admission to observation.  At this time, I will call for stat transport for the patient and will not delay transport/care. Clinical differentials include UTI, Nephro lithiasis, pyelonephritis, appendicitis, other.  I have noted that we do have OBGYN, who was accepting specialty, but also Urology and General surgery is also on-call if further intervention is needed.    [NAEEM]   0450 At this time, patient meets sepsis criteria.  She has a source which was positive for UTI.  Will start Zosyn at this time.  Patient continues to receive 30 cc/kg bolus    [NAEEM]   0459 EKG interpretation:  Rate 124, sinus tachycardia with no ST elevations or depressions.  Possible left atrial enlargement, nonspecific T-wave abnormality.    [NAEEM]   0507 Notified by RN that patient's blood pressure is 86/46 however patient's map has been stable.  Currently 64.  I have instructed RN to put in another large-bore IV and run 2 boluses simultaneously wide open along with antibiotics.  At bedside patient's heart rate is improving and she is clinically looking better.  At bedside I have repeated blood pressure and it is currently 102/50.    [NAEEM]      ED Course User Index  [NAEEM] Naty Hall MD     5:49 AM: 30mL/kg IVF have been administered within 3 hours of identification of SIRS criteria. Vital signs were reviewed and a focal sepsis perfusion assessment was  "performed.    Patient Condition: Patient stabilized  Reason for Transfer: Patient request  Accepting Physician: Lillie Lau MD: Rafael        Clinical Impression:       ICD-10-CM ICD-9-CM   1. Sepsis, due to unspecified organism, unspecified whether acute organ dysfunction present A41.9 038.9     995.91   2. Tachycardia R00.0 785.0   3. Decreased fetal movements in third trimester, fetus 1 of multiple gestation O36.8131 655.73   4. SIRS (systemic inflammatory response syndrome) R65.10 995.90   5. Right flank pain R10.9 789.09   6. Pelvic pain affecting pregnancy in third trimester, antepartum O26.893 646.83    R10.2 625.9   7. Acute cystitis with hematuria N30.01 595.0         Disposition:   Disposition: Placed in Observation  [facility] ED DISP CONDITION: Guarded.      All historical, clinical, radiographic, and laboratory findings were reviewed with the patient/family in detail along with the indications for transport to the facility in [place] in order to receive further evaluation/admission as well as treatment and close fetal monitoring by OBGYN.  All remaining questions and concerns were addressed at this time and the patient/family communicates/verbalizes understanding and agrees to proceed accordingly.  All questions were answered appropriately.  Similarly, all pertinent details of the encounter were discussed with Dr. Moreira who agrees to receive the patient at [facility] and delivery for further care as outlined above.  The patient will be transferred by [facility] ambulance services secondary to a need for ongoing cardiac monitoring, IV access with fluids, ALS en route.  Patient was hemodynamically stable for transfer.  Naty Hall MD  5:24 AM        Portions of this note may have been created with voice recognition software. Occasional "wrong-word" or "sound-a-like" substitutions may have occurred due to the inherent limitations of voice recognition software. Please, read the note " carefully and recognize, using context, where substitutions have occurred.                Naty Hall MD  02/18/20 0583

## 2020-02-18 NOTE — HPI
19 y /o  at 34 wks Presents per EMS from [facility] ER with pyelonephritis ; Pt reports symptoms of rt cva tenderness started yesterday.  Denies vaginal bleeding, +fetal movement

## 2020-02-18 NOTE — ED NOTES
In and out craterization done per Dr. Hall verbal orders to obtain urine sample using sterile technique

## 2020-02-18 NOTE — H&P
[facility] -   Obstetrics  History & Physical    Patient Name: Karyn Wells  MRN: [id number]  Admission Date: 2020  Primary Care Provider: Primary Doctor No    Subjective:     Principal Problem:Pyelonephritis    History of Present Illness:  Presents per EMS from [facility] ER with pyelonephritis     Obstetric HPI:  Patient reports no contractions, C/O decreased fetal movement no vaginal bleeding , No loss of fluid     This pregnancy has been complicated by ?? Seizure early in pregnancy awaiting neuro consult, anxiety    OB History    Para Term  AB Living   2 0 0 0 1 0   SAB TAB Ectopic Multiple Live Births   1 0 0 0 0      # Outcome Date GA Lbr Fidel/2nd Weight Sex Delivery Anes PTL Lv   2 Current            1 SAB              Past Medical History:   Diagnosis Date    Seizure     SVT (supraventricular tachycardia)      History reviewed. No pertinent surgical history.    PTA Medications   Medication Sig    [] benzonatate (TESSALON) 100 MG capsule Take 1 capsule (100 mg total) by mouth 3 (three) times daily as needed for Cough.    hydrOXYzine pamoate (VISTARIL) 25 MG Cap Take 2 capsules (50 mg total) by mouth 3 (three) times daily as needed.    prenatal vit-iron fum-folic ac (PRENATAL VITAMIN) 27 mg iron- 0.8 mg Tab Take 1 tablet by mouth once daily.       Review of patient's allergies indicates:  No Known Allergies     Family History     None        Tobacco Use    Smoking status: Never Smoker    Smokeless tobacco: Never Used   Substance and Sexual Activity    Alcohol use: No     Frequency: Never    Drug use: No    Sexual activity: Yes     Partners: Male     Review of Systems   Constitutional: Positive for chills and fever.   Gastrointestinal: Positive for nausea.   Genitourinary: Positive for flank pain.        Right side since yesterday   All other systems reviewed and are negative.     Objective:     Vital Signs (Most Recent):  Temp: 100.1 °F (37.8 °C)  (20 0429)  Pulse: 100 (20 05)  Resp: (!) 27 (20)  BP: (!) 107/54 (20)  SpO2: 100 % (20) Vital Signs (24h Range):  Temp:  [99.2 °F (37.3 °C)-100.1 °F (37.8 °C)] 100.1 °F (37.8 °C)  Pulse:  [] 100  Resp:  [21-27] 27  SpO2:  [98 %-100 %] 100 %  BP: ()/(46-59) 107/54     Weight: 73.9 kg (162 lb 14.7 oz)  Body mass index is 28.86 kg/m².    FHT:  CAT 1-2, reassuring  TOCO:  q 2-3 denies feeling    Physical Exam:   Constitutional: She is oriented to person, place, and time. She appears well-developed and well-nourished.       Cardiovascular: Normal rate and regular rhythm.     Pulmonary/Chest: Effort normal and breath sounds normal.        Abdominal: Soft.   + right flank pain     Genitourinary: Vagina normal and uterus normal.           Musculoskeletal: Moves all extremeties.       Neurological: She is alert and oriented to person, place, and time.    Skin: Skin is warm and dry.    Psychiatric: She has a normal mood and affect. Her behavior is normal.       Cervix:  Dilation:  0  Effacement:  0%  Station: Out of pelvis  Presentation: VTX, GBS obtained     Significant Labs:  Lab Results   Component Value Date    GROUPTRH AB POS 2019    HEPBSAG Negative 2019       I have personallly reviewed all pertinent lab results from the last 24 hours.    Assessment/Plan:     19 y.o. female  at 34w0d for:    * Pyelonephritis  IV fluids / antibiotics  C&S pending  Tylenol, pain meds prn  GBS obtained    Decreased fetal movement  NST        Annika Lindsey CNM  Obstetrics  [facility]

## 2020-02-18 NOTE — SUBJECTIVE & OBJECTIVE
Obstetric HPI:  Patient reports no contractions, C/O decreased fetal movement no vaginal bleeding , No loss of fluid     This pregnancy has been complicated by ?? Seizure early in pregnancy awaiting neuro consult, anxiety    OB History    Para Term  AB Living   2 0 0 0 1 0   SAB TAB Ectopic Multiple Live Births   1 0 0 0 0      # Outcome Date GA Lbr Fidel/2nd Weight Sex Delivery Anes PTL Lv   2 Current            1 SAB              Past Medical History:   Diagnosis Date    Seizure     SVT (supraventricular tachycardia)      History reviewed. No pertinent surgical history.    PTA Medications   Medication Sig    [] benzonatate (TESSALON) 100 MG capsule Take 1 capsule (100 mg total) by mouth 3 (three) times daily as needed for Cough.    hydrOXYzine pamoate (VISTARIL) 25 MG Cap Take 2 capsules (50 mg total) by mouth 3 (three) times daily as needed.    prenatal vit-iron fum-folic ac (PRENATAL VITAMIN) 27 mg iron- 0.8 mg Tab Take 1 tablet by mouth once daily.       Review of patient's allergies indicates:  No Known Allergies     Family History     None        Tobacco Use    Smoking status: Never Smoker    Smokeless tobacco: Never Used   Substance and Sexual Activity    Alcohol use: No     Frequency: Never    Drug use: No    Sexual activity: Yes     Partners: Male     Review of Systems   Constitutional: Positive for chills and fever.   Gastrointestinal: Positive for nausea.   Genitourinary: Positive for flank pain.        Right side since yesterday   All other systems reviewed and are negative.     Objective:     Vital Signs (Most Recent):  Temp: 100.1 °F (37.8 °C) (20 0429)  Pulse: 100 (20 0516)  Resp: (!) 27 (20)  BP: (!) 107/54 (20 0516)  SpO2: 100 % (20 0516) Vital Signs (24h Range):  Temp:  [99.2 °F (37.3 °C)-100.1 °F (37.8 °C)] 100.1 °F (37.8 °C)  Pulse:  [] 100  Resp:  [21-27] 27  SpO2:  [98 %-100 %] 100 %  BP: ()/(46-59) 107/54     Weight:  73.9 kg (162 lb 14.7 oz)  Body mass index is 28.86 kg/m².    FHT:  CAT 1-2, reassuring  TOCO:  q 2-3 denies feeling    Physical Exam:   Constitutional: She is oriented to person, place, and time. She appears well-developed and well-nourished.       Cardiovascular: Normal rate and regular rhythm.     Pulmonary/Chest: Effort normal and breath sounds normal.        Abdominal: Soft.   + right flank pain     Genitourinary: Vagina normal and uterus normal.           Musculoskeletal: Moves all extremeties.       Neurological: She is alert and oriented to person, place, and time.    Skin: Skin is warm and dry.    Psychiatric: She has a normal mood and affect. Her behavior is normal.       Cervix:  Dilation:  0  Effacement:  0%  Station: Out of pelvis  Presentation: VTX, GBS obtained     Significant Labs:  Lab Results   Component Value Date    GROUPTR AB POS 12/02/2019    HEPBSAG Negative 12/02/2019       I have personallly reviewed all pertinent lab results from the last 24 hours.

## 2020-02-19 ENCOUNTER — PATIENT MESSAGE (OUTPATIENT)
Dept: OBSTETRICS AND GYNECOLOGY | Facility: CLINIC | Age: 20
End: 2020-02-19

## 2020-02-19 PROBLEM — A41.9 SEPSIS: Status: ACTIVE | Noted: 2020-02-19

## 2020-02-19 PROBLEM — R07.89 OTHER CHEST PAIN: Status: ACTIVE | Noted: 2020-02-19

## 2020-02-19 PROCEDURE — 99232 PR SUBSEQUENT HOSPITAL CARE,LEVL II: ICD-10-PCS | Mod: ,,, | Performed by: OBSTETRICS & GYNECOLOGY

## 2020-02-19 PROCEDURE — 25000003 PHARM REV CODE 250: Performed by: OBSTETRICS & GYNECOLOGY

## 2020-02-19 PROCEDURE — 93010 EKG 12-LEAD: ICD-10-PCS | Mod: ,,, | Performed by: INTERNAL MEDICINE

## 2020-02-19 PROCEDURE — 96361 HYDRATE IV INFUSION ADD-ON: CPT

## 2020-02-19 PROCEDURE — 93010 ELECTROCARDIOGRAM REPORT: CPT | Mod: ,,, | Performed by: INTERNAL MEDICINE

## 2020-02-19 PROCEDURE — 93005 ELECTROCARDIOGRAM TRACING: CPT

## 2020-02-19 PROCEDURE — 96366 THER/PROPH/DIAG IV INF ADDON: CPT

## 2020-02-19 PROCEDURE — 25000003 PHARM REV CODE 250: Performed by: ADVANCED PRACTICE MIDWIFE

## 2020-02-19 PROCEDURE — 63600175 PHARM REV CODE 636 W HCPCS: Performed by: OBSTETRICS & GYNECOLOGY

## 2020-02-19 PROCEDURE — 99232 SBSQ HOSP IP/OBS MODERATE 35: CPT | Mod: ,,, | Performed by: OBSTETRICS & GYNECOLOGY

## 2020-02-19 PROCEDURE — 11000001 HC ACUTE MED/SURG PRIVATE ROOM

## 2020-02-19 RX ORDER — HYDROCODONE BITARTRATE AND ACETAMINOPHEN 7.5; 325 MG/1; MG/1
1 TABLET ORAL EVERY 6 HOURS PRN
Status: DISCONTINUED | OUTPATIENT
Start: 2020-02-19 | End: 2020-02-21 | Stop reason: HOSPADM

## 2020-02-19 RX ORDER — HYDROCODONE BITARTRATE AND ACETAMINOPHEN 5; 325 MG/1; MG/1
1 TABLET ORAL EVERY 6 HOURS PRN
Status: DISCONTINUED | OUTPATIENT
Start: 2020-02-19 | End: 2020-02-21 | Stop reason: HOSPADM

## 2020-02-19 RX ADMIN — HYDROCODONE BITARTRATE AND ACETAMINOPHEN 1 TABLET: 5; 325 TABLET ORAL at 06:02

## 2020-02-19 RX ADMIN — HYDROXYZINE PAMOATE 50 MG: 25 CAPSULE ORAL at 06:02

## 2020-02-19 RX ADMIN — CEFTRIAXONE SODIUM 1 G: 1 INJECTION, POWDER, FOR SOLUTION INTRAMUSCULAR; INTRAVENOUS at 10:02

## 2020-02-19 RX ADMIN — BUTALBITAL, ACETAMINOPHEN AND CAFFEINE 1 TABLET: 50; 325; 40 TABLET ORAL at 07:02

## 2020-02-19 RX ADMIN — HYDROCODONE BITARTRATE AND ACETAMINOPHEN 1 TABLET: 7.5; 325 TABLET ORAL at 06:02

## 2020-02-19 RX ADMIN — HYDROXYZINE PAMOATE 50 MG: 25 CAPSULE ORAL at 07:02

## 2020-02-19 RX ADMIN — ACETAMINOPHEN 650 MG: 325 TABLET ORAL at 01:02

## 2020-02-19 NOTE — MEDICAL/APP STUDENT
Subjective:       Patient ID: Karyn Wells is a 19 y.o. female.    Chief Complaint: Emesis During Pregnancy and Abdominal Pain (RLQ)    HPI:  19 y /o  at 34 wks presents per EMS from [facility] ER with pyelonephritis; Pt reports symptoms of rt cva tenderness started yesterday.  Denies vaginal bleeding,     Abdominal Pain   This is a new problem. The current episode started yesterday. The onset quality is undetermined. The problem occurs constantly. The problem has been unchanged. The pain is located in the right flank. The pain is at a severity of 10/10. The abdominal pain radiates to the RLQ and suprapubic region. Associated symptoms include hematuria, nausea and vomiting. She has tried antibiotics for the symptoms. The treatment provided mild relief.     Interval history: The patient awoke around 0500 with complaining of difficulty breathing and chest pain. The patient states her current right flank pain is 10/10 in severity. The patient states she is breathing faster than she usually does and is having trouble catching her breath. She admits to previous episodes of panic attacks, but states the current symptoms does not feel like those previous episodes.    Review of Systems   Respiratory: Positive for shortness of breath.    Cardiovascular: Positive for chest pain.   Gastrointestinal: Positive for abdominal pain, nausea and vomiting.   Genitourinary: Positive for flank pain and hematuria.       Objective:      Physical Exam   Constitutional: She is oriented to person, place, and time. She appears lethargic. She has a sickly appearance.   HENT:   Head: Normocephalic and atraumatic.   Neck: Neck supple.   Cardiovascular: Normal rate, regular rhythm and normal heart sounds.   Pulmonary/Chest: Tachypnea noted. She has rhonchi in the right lower field.   Abdominal: Soft. She exhibits no distension. There is no guarding.   Neurological: She is oriented to person, place, and time. She appears  lethargic.       Assessment:       1. Sepsis, due to unspecified organism, unspecified whether acute organ dysfunction present    2. Tachycardia    3. Decreased fetal movements in third trimester, fetus 1 of multiple gestation    4. SIRS (systemic inflammatory response syndrome)    5. Right flank pain    6. Pelvic pain affecting pregnancy in third trimester, antepartum    7. Acute cystitis with hematuria    8. Pyelonephritis    9. Chest pain        Plan:   *Chest pain/tachypnea  CXR and EKG  Pulmonary assessment  Continue to monitor vitals  Hold IV fluids  Patient feels she is breathing fast because her chest is hurting.  Lungs are clear, oxygen sats are normal; chest xray/ekg pending  Will continue to monitor  *Pyelonephritis  Continue IV rocephin, culture and sensitivities pending  Tylenol and pain medication prn

## 2020-02-19 NOTE — ASSESSMENT & PLAN NOTE
IV fluids / antibiotics  C&S pending  Tylenol, pain meds prn  GBS obtained  2/18/2020--agree with plan of rocephin, iv abx, pain control; await ucx results and sensitivities

## 2020-02-19 NOTE — PROGRESS NOTES
[facility]  Obstetrics  Antepartum Progress Note    Patient Name: Karyn Wells  MRN: [id number]  Admission Date: 2020  Hospital Length of Stay: 0 days  Attending Physician: Pamela Moreira MD  Primary Care Provider: Primary Doctor No    Subjective:     Principal Problem:Pyelonephritis    HPI:  19 y /o  at 34 wks Presents per EMS from [facility] ER with pyelonephritis ; Pt reports symptoms of rt cva tenderness started yesterday.  Denies vaginal bleeding, +fetal movement    Hospital Course:  EFM / NST  IV antibiotics  Tylenol, pain meds prn  IV fluids  2020--ucx pending await sensitivities to adjust abx; continue iv rocephin for approx 48 hr; nst bid    Obstetric HPI:  Patient reports None contractions, active fetal movement, absent vaginal bleeding , absent loss of fluid     Reports feeling better.  Denies SOB, CP.  Resting comfortably.  O2 rebreather on.     Objective:     Vital Signs (Most Recent):  Temp: 98.8 °F (37.1 °C) (20 0802)  Pulse: 101 (20 0757)  Resp: 20 (20 0802)  BP: (!) 97/55 (20 0642)  SpO2: 100 % (20 0757) Vital Signs (24h Range):  Temp:  [98.1 °F (36.7 °C)-101.7 °F (38.7 °C)] 98.8 °F (37.1 °C)  Pulse:  [] 101  Resp:  [18-24] 20  SpO2:  [97 %-100 %] 100 %  BP: ()/(30-77) 97/55     Weight: 73.9 kg (162 lb 14.7 oz)  Body mass index is 28.86 kg/m².        Intake/Output Summary (Last 24 hours) at 2020 0850  Last data filed at 2020 1730  Gross per 24 hour   Intake 1837.5 ml   Output --   Net 1837.5 ml       Significant Labs:  Recent Lab Results     None          Physical Exam:   Constitutional: She is oriented to person, place, and time. She appears well-developed and well-nourished. No distress.       Cardiovascular: Normal rate, regular rhythm and normal heart sounds.     Pulmonary/Chest: Effort normal and breath sounds normal.        Abdominal: Soft. Bowel sounds are normal. She exhibits no distension.  There is no tenderness. There is CVA tenderness (right).             Musculoskeletal: Normal range of motion and moves all extremeties. She exhibits no edema or tenderness.       Neurological: She is alert and oriented to person, place, and time.    Skin: Skin is warm and dry.    Psychiatric: She has a normal mood and affect. Her behavior is normal. Thought content normal.       Assessment/Plan:     19 y.o. female  at 34w1d for:    * Pyelonephritis  HD # 2  Continue IV fluids/antibiotics.  Await C&S results. Tylenol, pain meds prn.  Has been afebrile for 20+ hrs.  Will monitor closely.  Will need IV antibiotics for 48-72 hrs.  Pt counseled on management plan and discharge goals.      Other chest pain  No CP or SOB since earlier this AM.          Kar Mitchell MD  Obstetrics  [facility]

## 2020-02-19 NOTE — ASSESSMENT & PLAN NOTE
HD # 2  Continue IV fluids/antibiotics.  Await C&S results. Tylenol, pain meds prn.  Has been afebrile for 20+ hrs.  Will monitor closely.  Will need IV antibiotics for 48-72 hrs.  Pt counseled on management plan and discharge goals.

## 2020-02-19 NOTE — SUBJECTIVE & OBJECTIVE
Obstetric HPI:  Patient reports None contractions, active fetal movement, absent vaginal bleeding , absent loss of fluid     Reports feeling better.  Denies SOB, CP.  Resting comfortably.  O2 rebreather on.     Objective:     Vital Signs (Most Recent):  Temp: 98.8 °F (37.1 °C) (02/19/20 0802)  Pulse: 101 (02/19/20 0757)  Resp: 20 (02/19/20 0802)  BP: (!) 97/55 (02/19/20 0642)  SpO2: 100 % (02/19/20 0757) Vital Signs (24h Range):  Temp:  [98.1 °F (36.7 °C)-101.7 °F (38.7 °C)] 98.8 °F (37.1 °C)  Pulse:  [] 101  Resp:  [18-24] 20  SpO2:  [97 %-100 %] 100 %  BP: ()/(30-77) 97/55     Weight: 73.9 kg (162 lb 14.7 oz)  Body mass index is 28.86 kg/m².        Intake/Output Summary (Last 24 hours) at 2/19/2020 0850  Last data filed at 2/18/2020 1730  Gross per 24 hour   Intake 1837.5 ml   Output --   Net 1837.5 ml       Significant Labs:  Recent Lab Results     None          Physical Exam:   Constitutional: She is oriented to person, place, and time. She appears well-developed and well-nourished. No distress.       Cardiovascular: Normal rate, regular rhythm and normal heart sounds.     Pulmonary/Chest: Effort normal and breath sounds normal.        Abdominal: Soft. Bowel sounds are normal. She exhibits no distension. There is no tenderness. There is CVA tenderness (right).             Musculoskeletal: Normal range of motion and moves all extremeties. She exhibits no edema or tenderness.       Neurological: She is alert and oriented to person, place, and time.    Skin: Skin is warm and dry.    Psychiatric: She has a normal mood and affect. Her behavior is normal. Thought content normal.

## 2020-02-19 NOTE — PROGRESS NOTES
On assessment pt SpO2 was 100% and pt did not have SOB. Lung sounds clear but lower lobes diminished. Non-rebreather mask removed. SpO2 94-95% after this. Pt still without SOB. MD notified via secure chat. No additional orders received.

## 2020-02-19 NOTE — PROGRESS NOTES
[facility]  Obstetrics & Gynecology  Progress Note    Patient Name: Karyn Wells  MRN: [id number]  Admission Date: 2020  Primary Care Provider: Primary Doctor No  Principal Problem: Pyelonephritis    Subjective:     HPI:  19 y /o  at 34 wks Presents per EMS from [facility] ER with pyelonephritis ; Pt reports symptoms of rt cva tenderness started yesterday.  Denies vaginal bleeding, +fetal movement    No new subjective & objective note has been filed under this hospital service since the last note was generated.    Assessment/Plan:     * Pyelonephritis  IV fluids / antibiotics  C&S pending  Tylenol, pain meds prn  GBS obtained  2020--agree with plan of rocephin, iv abx, pain control; await ucx results and sensitivities        Beatrice Bashir MD  Obstetrics & Gynecology  [facility]

## 2020-02-19 NOTE — NURSING
Patient woke up with SOB and chest pain. Also complained of 10/10 pain on right side of back. O2 on patient, temperature 98.1. Consulted Dr Bashir. Ordered EKG and X Ray.

## 2020-02-20 LAB
BACTERIA SPEC AEROBE CULT: NORMAL
BACTERIA UR CULT: ABNORMAL

## 2020-02-20 PROCEDURE — 59025 FETAL NON-STRESS TEST: CPT

## 2020-02-20 PROCEDURE — 63600175 PHARM REV CODE 636 W HCPCS: Performed by: OBSTETRICS & GYNECOLOGY

## 2020-02-20 PROCEDURE — 99232 SBSQ HOSP IP/OBS MODERATE 35: CPT | Mod: ,,, | Performed by: OBSTETRICS & GYNECOLOGY

## 2020-02-20 PROCEDURE — 11000001 HC ACUTE MED/SURG PRIVATE ROOM

## 2020-02-20 PROCEDURE — 99232 PR SUBSEQUENT HOSPITAL CARE,LEVL II: ICD-10-PCS | Mod: ,,, | Performed by: OBSTETRICS & GYNECOLOGY

## 2020-02-20 PROCEDURE — 25000003 PHARM REV CODE 250: Performed by: ADVANCED PRACTICE MIDWIFE

## 2020-02-20 PROCEDURE — 96361 HYDRATE IV INFUSION ADD-ON: CPT

## 2020-02-20 PROCEDURE — 25000003 PHARM REV CODE 250: Performed by: OBSTETRICS & GYNECOLOGY

## 2020-02-20 RX ADMIN — CEFTRIAXONE SODIUM 1 G: 1 INJECTION, POWDER, FOR SOLUTION INTRAMUSCULAR; INTRAVENOUS at 09:02

## 2020-02-20 RX ADMIN — ACETAMINOPHEN 650 MG: 325 TABLET ORAL at 10:02

## 2020-02-20 RX ADMIN — HYDROXYZINE PAMOATE 50 MG: 25 CAPSULE ORAL at 09:02

## 2020-02-20 RX ADMIN — HYDROXYZINE PAMOATE 50 MG: 25 CAPSULE ORAL at 02:02

## 2020-02-20 RX ADMIN — HYDROCODONE BITARTRATE AND ACETAMINOPHEN 1 TABLET: 7.5; 325 TABLET ORAL at 07:02

## 2020-02-20 NOTE — PROGRESS NOTES
[facility]   Obstetrics  Antepartum Progress Note    Patient Name: Karyn Wells  MRN: [id number]  Admission Date: 2020  Hospital Length of Stay: 1 days  Attending Physician: Pamela Moreira MD  Primary Care Provider: Primary Doctor No    Subjective:     Principal Problem:Pyelonephritis    HPI:  19 y /o  at 34 wks Presents per EMS from [facility] ER with pyelonephritis ; Pt reports symptoms of rt cva tenderness started yesterday.  Denies vaginal bleeding, +fetal movement    Hospital Course:  EFM / NST  IV antibiotics  Tylenol, pain meds prn  IV fluids  2020--ucx pending await sensitivities to adjust abx; continue iv rocephin for approx 48 hr; nst bid    Obstetric HPI:  Patient reports None contractions, active fetal movement, absent vaginal bleeding , absent loss of fluid.  Still has 7/10 persistent right sided flank pain.  Pain medication somewhat helps, but overall feels similar in intensity as when she presented     Objective:     Vital Signs (Most Recent):  Temp: 98.1 °F (36.7 °C) (20)  Pulse: 94 (20)  Resp: 16 (20)  BP: 126/89 (20)  SpO2: 96 % (20) Vital Signs (24h Range):  Temp:  [97.7 °F (36.5 °C)-98.4 °F (36.9 °C)] 98.1 °F (36.7 °C)  Pulse:  [] 94  Resp:  [16-18] 16  SpO2:  [94 %-99 %] 96 %  BP: (101-127)/(45-89) 126/89     Weight: 73.9 kg (162 lb 14.7 oz)  Body mass index is 28.86 kg/m².    FHT: Cat 1 (reassuring)  TOCO:  Q 3-5 minutes (ctxns noted q 3-5 minutes, but patient does not feel any of them)      Intake/Output Summary (Last 24 hours) at 2020 0915  Last data filed at 2020 1106  Gross per 24 hour   Intake 112.5 ml   Output --   Net 112.5 ml            Significant Labs:  Recent Lab Results     None          Physical Exam:   Constitutional: She is oriented to person, place, and time. She appears well-developed and well-nourished. No distress.    HENT:   Head: Normocephalic and atraumatic.      Neck: Neck supple. No thyromegaly present.     Pulmonary/Chest: Effort normal.        Abdominal: Soft. She exhibits no distension and no mass. There is no tenderness. There is no rebound and no guarding.   Uterus gravid, soft, and non-tender    Right sided CVA tenderness present             Musculoskeletal: She exhibits no edema.       Neurological: She is alert and oriented to person, place, and time.    Skin: No rash noted.    Psychiatric: She has a normal mood and affect. Her behavior is normal. Judgment and thought content normal.       Assessment/Plan:     19 y.o. female  at 34w2d for:    * Pyelonephritis  HD # 3  Continue IV fluids/antibiotics.  Prelim urine culture shows Gram neg rods (likely E. Coli).  Blood culture NGTD x 2.  Tylenol, pain meds prn.  Has been afebrile for > 48 hrs.  Still with significant right flank pain and CVAT on exam.  Check renal ultrasound for possible abscess.  Continue rocephin.      Sepsis  Secondary to pyelonephritis.  Afebrile x 48 hours.  F/u final urine and blood cultures.    Other chest pain  Chest pain and SOB have resolved.    Premature uterine contractions  Noted on toco, but patient does not feel them.  Continue close surveillance          Marija Jimenez MD  Obstetrics  [facility]

## 2020-02-20 NOTE — PROGRESS NOTES
[facility]  Obstetrics  Antepartum Progress Note    Patient Name: Karyn Wells  MRN: [id number]  Admission Date: 2020  Hospital Length of Stay: 1 days  Attending Physician: Pamela Moreira MD  Primary Care Provider: Primary Doctor No    Subjective:     Principal Problem:Pyelonephritis    HPI:  19 y /o  at 34 wks Presents per EMS from [facility] ER with pyelonephritis ; Pt reports symptoms of rt cva tenderness started yesterday.  Denies vaginal bleeding, +fetal movement    Hospital Course:  EFM / NST  IV antibiotics  Tylenol, pain meds prn  IV fluids  2020--ucx pending await sensitivities to adjust abx; continue iv rocephin for approx 48 hr; nst bid    Obstetric HPI:  Patient reports None contractions, active fetal movement, absent vaginal bleeding , absent loss of fluid    Pt feels somewhat better this AM.  Reports she still has some right flank pains.  Denies fevers, nausea, vomiting, diarrhea.  No CP or SOB this AM.      Objective:     Vital Signs (Most Recent):  Temp: 98.1 °F (36.7 °C) (20)  Pulse: 94 (20)  Resp: 16 (20)  BP: 126/89 (20)  SpO2: 96 % (20) Vital Signs (24h Range):  Temp:  [97.7 °F (36.5 °C)-98.4 °F (36.9 °C)] 98.1 °F (36.7 °C)  Pulse:  [] 94  Resp:  [16-18] 16  SpO2:  [94 %-99 %] 96 %  BP: (101-127)/(45-89) 126/89     Weight: 73.9 kg (162 lb 14.7 oz)  Body mass index is 28.86 kg/m².    FHT: Cat 1 (reassuring)  TOCO:        Intake/Output Summary (Last 24 hours) at 2020 0835  Last data filed at 2020 1106  Gross per 24 hour   Intake 112.5 ml   Output --   Net 112.5 ml       Significant Labs:  Recent Lab Results     None          Physical Exam:   Constitutional: She is oriented to person, place, and time. She appears well-developed and well-nourished. No distress.       Cardiovascular: Normal rate, regular rhythm and normal heart sounds.     Pulmonary/Chest: Effort normal and breath  sounds normal.        Abdominal: Soft. Bowel sounds are normal. She exhibits no distension. There is no tenderness. There is CVA tenderness (right).             Musculoskeletal: Normal range of motion and moves all extremeties. She exhibits no edema or tenderness.       Neurological: She is alert and oriented to person, place, and time.    Skin: Skin is warm and dry.    Psychiatric: She has a normal mood and affect. Her behavior is normal. Thought content normal.       Assessment/Plan:     19 y.o. female  at 34w2d for:    * Pyelonephritis  HD # 3  Continue IV fluids/antibiotics.  Prelim culture shows Gram neg rods (likely E. coli). Tylenol, pain meds prn.  Has been afebrile for nearly 48 hrs.  Will monitor closely.  Will continue IV antibiotics until isolate and sensitivities confirmed.  Will also await improvement of CVA pains.  Pt counseled on management plan and discharge goals.      Other chest pain  No CP or SOB since earlier this AM.          Kar Mitchell MD  Obstetrics  [facility]

## 2020-02-20 NOTE — ASSESSMENT & PLAN NOTE
HD # 3  Continue IV fluids/antibiotics.  Prelim urine culture shows Gram neg rods (likely E. Coli).  Blood culture NGTD x 2.  Tylenol, pain meds prn.  Has been afebrile for > 48 hrs.  Still with significant right flank pain and CVAT on exam.  Check renal ultrasound for possible abscess.  Continue rocephin.

## 2020-02-20 NOTE — ASSESSMENT & PLAN NOTE
HD # 3  Continue IV fluids/antibiotics.  Prelim culture shows Gram neg rods (likely E. coli). Tylenol, pain meds prn.  Has been afebrile for nearly 48 hrs.  Will monitor closely.  Will continue IV antibiotics until isolate and sensitivities confirmed.  Will also await improvement of CVA pains.  Pt counseled on management plan and discharge goals.

## 2020-02-20 NOTE — SUBJECTIVE & OBJECTIVE
Obstetric HPI:  Patient reports None contractions, active fetal movement, absent vaginal bleeding , absent loss of fluid.  Still has 7/10 persistent right sided flank pain.  Pain medication somewhat helps, but overall feels similar in intensity as when she presented     Objective:     Vital Signs (Most Recent):  Temp: 98.1 °F (36.7 °C) (02/20/20 0711)  Pulse: 94 (02/20/20 0712)  Resp: 16 (02/20/20 0711)  BP: 126/89 (02/20/20 0711)  SpO2: 96 % (02/20/20 0712) Vital Signs (24h Range):  Temp:  [97.7 °F (36.5 °C)-98.4 °F (36.9 °C)] 98.1 °F (36.7 °C)  Pulse:  [] 94  Resp:  [16-18] 16  SpO2:  [94 %-99 %] 96 %  BP: (101-127)/(45-89) 126/89     Weight: 73.9 kg (162 lb 14.7 oz)  Body mass index is 28.86 kg/m².    FHT: Cat 1 (reassuring)  TOCO:  Q 3-5 minutes (ctxns noted q 3-5 minutes, but patient does not feel any of them)      Intake/Output Summary (Last 24 hours) at 2/20/2020 0915  Last data filed at 2/19/2020 1106  Gross per 24 hour   Intake 112.5 ml   Output --   Net 112.5 ml            Significant Labs:  Recent Lab Results     None          Physical Exam:   Constitutional: She is oriented to person, place, and time. She appears well-developed and well-nourished. No distress.    HENT:   Head: Normocephalic and atraumatic.     Neck: Neck supple. No thyromegaly present.     Pulmonary/Chest: Effort normal.        Abdominal: Soft. She exhibits no distension and no mass. There is no tenderness. There is no rebound and no guarding.   Uterus gravid, soft, and non-tender    Right sided CVA tenderness present             Musculoskeletal: She exhibits no edema.       Neurological: She is alert and oriented to person, place, and time.    Skin: No rash noted.    Psychiatric: She has a normal mood and affect. Her behavior is normal. Judgment and thought content normal.

## 2020-02-20 NOTE — SUBJECTIVE & OBJECTIVE
Obstetric HPI:  Patient reports None contractions, active fetal movement, absent vaginal bleeding , absent loss of fluid    Pt feels somewhat better this AM.  Reports she still has some right flank pains.  Denies fevers, nausea, vomiting, diarrhea.  No CP or SOB this AM.      Objective:     Vital Signs (Most Recent):  Temp: 98.1 °F (36.7 °C) (02/20/20 0711)  Pulse: 94 (02/20/20 0712)  Resp: 16 (02/20/20 0711)  BP: 126/89 (02/20/20 0711)  SpO2: 96 % (02/20/20 0712) Vital Signs (24h Range):  Temp:  [97.7 °F (36.5 °C)-98.4 °F (36.9 °C)] 98.1 °F (36.7 °C)  Pulse:  [] 94  Resp:  [16-18] 16  SpO2:  [94 %-99 %] 96 %  BP: (101-127)/(45-89) 126/89     Weight: 73.9 kg (162 lb 14.7 oz)  Body mass index is 28.86 kg/m².    FHT: Cat 1 (reassuring)  TOCO:        Intake/Output Summary (Last 24 hours) at 2/20/2020 0835  Last data filed at 2/19/2020 1106  Gross per 24 hour   Intake 112.5 ml   Output --   Net 112.5 ml       Significant Labs:  Recent Lab Results     None          Physical Exam:   Constitutional: She is oriented to person, place, and time. She appears well-developed and well-nourished. No distress.       Cardiovascular: Normal rate, regular rhythm and normal heart sounds.     Pulmonary/Chest: Effort normal and breath sounds normal.        Abdominal: Soft. Bowel sounds are normal. She exhibits no distension. There is no tenderness. There is CVA tenderness (right).             Musculoskeletal: Normal range of motion and moves all extremeties. She exhibits no edema or tenderness.       Neurological: She is alert and oriented to person, place, and time.    Skin: Skin is warm and dry.    Psychiatric: She has a normal mood and affect. Her behavior is normal. Thought content normal.

## 2020-02-21 VITALS
BODY MASS INDEX: 28.87 KG/M2 | TEMPERATURE: 98 F | RESPIRATION RATE: 17 BRPM | HEART RATE: 107 BPM | WEIGHT: 162.94 LBS | DIASTOLIC BLOOD PRESSURE: 78 MMHG | SYSTOLIC BLOOD PRESSURE: 119 MMHG | HEIGHT: 63 IN | OXYGEN SATURATION: 98 %

## 2020-02-21 PROCEDURE — 25000003 PHARM REV CODE 250: Performed by: ADVANCED PRACTICE MIDWIFE

## 2020-02-21 PROCEDURE — 59025 FETAL NON-STRESS TEST: CPT

## 2020-02-21 PROCEDURE — 99238 PR HOSPITAL DISCHARGE DAY,<30 MIN: ICD-10-PCS | Mod: ,,, | Performed by: OBSTETRICS & GYNECOLOGY

## 2020-02-21 PROCEDURE — 99238 HOSP IP/OBS DSCHRG MGMT 30/<: CPT | Mod: ,,, | Performed by: OBSTETRICS & GYNECOLOGY

## 2020-02-21 RX ORDER — AMOXICILLIN AND CLAVULANATE POTASSIUM 875; 125 MG/1; MG/1
1 TABLET, FILM COATED ORAL EVERY 12 HOURS
Qty: 20 TABLET | Refills: 0 | Status: SHIPPED | OUTPATIENT
Start: 2020-02-21 | End: 2020-03-02

## 2020-02-21 RX ORDER — HYDROCODONE BITARTRATE AND ACETAMINOPHEN 5; 325 MG/1; MG/1
1 TABLET ORAL EVERY 6 HOURS PRN
Qty: 10 TABLET | Refills: 0 | Status: SHIPPED | OUTPATIENT
Start: 2020-02-21 | End: 2020-03-05

## 2020-02-21 RX ADMIN — HYDROXYZINE PAMOATE 50 MG: 25 CAPSULE ORAL at 03:02

## 2020-02-21 RX ADMIN — HYDROXYZINE PAMOATE 50 MG: 25 CAPSULE ORAL at 09:02

## 2020-02-21 NOTE — NURSING
Pt given d/c instructions; pt fully awake. Pt verbalized understanding. Pt denies need for school or work excuse. Pt mother at bedside to take her home. Pt denies any further questions at this time; pt given unit phone number. Pt denies need to use wheelchair to go home. Pt to leave via private car with her mother, who will be driving.

## 2020-02-21 NOTE — ASSESSMENT & PLAN NOTE
HD # 4  Renal u/s negative.  Patient very clinically improved and stable for discharge to home on oral antibiotics.

## 2020-02-21 NOTE — PROGRESS NOTES
[facility]   Obstetrics  Antepartum Progress Note    Patient Name: Karyn Wells  MRN: [id number]  Admission Date: 2020  Hospital Length of Stay: 2 days  Attending Physician: Pamela Moreira MD  Primary Care Provider: Primary Doctor No    Subjective:     Principal Problem:Pyelonephritis    HPI:  19 y /o  at 34 wks Presents per EMS from [facility] ER with pyelonephritis ; Pt reports symptoms of rt cva tenderness started yesterday.  Denies vaginal bleeding, +fetal movement    Hospital Course:  Patient admitted for IV antibiotic therapy with Rocephin for pyelonephritis.  Clinical improvement noted.  Urine culture positive for E. Coli - pan sensitive  On HD#4 , patient noted to be afebrile and pain level very improved.  Stable for discharge to home.    Obstetric HPI:  Patient reports No contractions, active fetal movement, absent vaginal bleeding , absent loss of fluid.  Pain level very improved today.      Objective:     Vital Signs (Most Recent):  Temp: 98.3 °F (36.8 °C) (20 0743)  Pulse: 103 (20 0837)  Resp: 17 (20 0830)  BP: 123/80 (20 0743)  SpO2: 99 % (20 0837) Vital Signs (24h Range):  Temp:  [97.8 °F (36.6 °C)-98.6 °F (37 °C)] 98.3 °F (36.8 °C)  Pulse:  [] 103  Resp:  [16-18] 17  SpO2:  [97 %-100 %] 99 %  BP: (105-125)/(62-81) 123/80     Weight: 73.9 kg (162 lb 14.7 oz)  Body mass index is 28.86 kg/m².    FHT:  Cat 1 (reassuring)  TOCO:  Q 0 minutes      Intake/Output Summary (Last 24 hours) at 2020 0903  Last data filed at 2020 0825  Gross per 24 hour   Intake --   Output 1650 ml   Net -1650 ml            Significant Labs:  Recent Lab Results     None          Physical Exam:   Constitutional: She is oriented to person, place, and time. No distress.    HENT:   Head: Normocephalic and atraumatic.     Neck: Neck supple.    Cardiovascular: Normal rate.     Pulmonary/Chest: Effort normal.        Abdominal: Soft. There is no  tenderness.             Musculoskeletal: She exhibits no edema or tenderness.       Neurological: She is alert and oriented to person, place, and time.     Psychiatric: She has a normal mood and affect.       Assessment/Plan:     19 y.o. female  at 34w3d for:    * Pyelonephritis  HD # 4  Renal u/s negative.  Patient very clinically improved and stable for discharge to home on oral antibiotics.    Sepsis  Secondary to pyelonephritis.  Afebrile x 48 hours.  F/u final blood cultures.    Other chest pain  Chest pain and SOB have resolved.    Premature uterine contractions  No evidence of  labor          Olivia Celaya MD  Obstetrics  [facility]

## 2020-02-21 NOTE — NURSING
Dr. Belia MD notified about potentially changing d/c until after lunch and vistaril admin. Pt does not have vistaril with her. Antibiotics and pain meds already delivered by retail pharmacy.

## 2020-02-21 NOTE — DISCHARGE INSTRUCTIONS

## 2020-02-21 NOTE — NURSING
Pt updated with plan of care; pt to d/c home; retail pharmacy will fill meds; pt mother will be her ride home; pt mother will be her ride home; pt mother will be here to pick her up approx 1600.

## 2020-02-21 NOTE — SUBJECTIVE & OBJECTIVE
Obstetric HPI:  Patient reports No contractions, active fetal movement, absent vaginal bleeding , absent loss of fluid.  Pain level very improved today.      Objective:     Vital Signs (Most Recent):  Temp: 98.3 °F (36.8 °C) (02/21/20 0743)  Pulse: 103 (02/21/20 0837)  Resp: 17 (02/21/20 0830)  BP: 123/80 (02/21/20 0743)  SpO2: 99 % (02/21/20 0837) Vital Signs (24h Range):  Temp:  [97.8 °F (36.6 °C)-98.6 °F (37 °C)] 98.3 °F (36.8 °C)  Pulse:  [] 103  Resp:  [16-18] 17  SpO2:  [97 %-100 %] 99 %  BP: (105-125)/(62-81) 123/80     Weight: 73.9 kg (162 lb 14.7 oz)  Body mass index is 28.86 kg/m².    FHT:  Cat 1 (reassuring)  TOCO:  Q 0 minutes      Intake/Output Summary (Last 24 hours) at 2/21/2020 0903  Last data filed at 2/21/2020 0825  Gross per 24 hour   Intake --   Output 1650 ml   Net -1650 ml            Significant Labs:  Recent Lab Results     None          Physical Exam:   Constitutional: She is oriented to person, place, and time. No distress.    HENT:   Head: Normocephalic and atraumatic.     Neck: Neck supple.    Cardiovascular: Normal rate.     Pulmonary/Chest: Effort normal.        Abdominal: Soft. There is no tenderness.             Musculoskeletal: She exhibits no edema or tenderness.       Neurological: She is alert and oriented to person, place, and time.     Psychiatric: She has a normal mood and affect.

## 2020-02-21 NOTE — PLAN OF CARE
Pt. Admitted for pyelonephritis. IV antibiotics, VSS, afebrile. 1300 mL output throughout the night. Pt. Doing well. Will continue to monitor.

## 2020-02-23 LAB
BACTERIA BLD CULT: NORMAL
BACTERIA BLD CULT: NORMAL

## 2020-02-26 ENCOUNTER — PATIENT MESSAGE (OUTPATIENT)
Dept: OBSTETRICS AND GYNECOLOGY | Facility: CLINIC | Age: 20
End: 2020-02-26

## 2020-02-27 ENCOUNTER — ROUTINE PRENATAL (OUTPATIENT)
Dept: OBSTETRICS AND GYNECOLOGY | Facility: CLINIC | Age: 20
End: 2020-02-27
Payer: MEDICAID

## 2020-02-27 VITALS
WEIGHT: 164.25 LBS | SYSTOLIC BLOOD PRESSURE: 122 MMHG | DIASTOLIC BLOOD PRESSURE: 78 MMHG | BODY MASS INDEX: 29.09 KG/M2

## 2020-02-27 DIAGNOSIS — Z86.19 HISTORY OF SEPSIS: ICD-10-CM

## 2020-02-27 DIAGNOSIS — Z3A.35 35 WEEKS GESTATION OF PREGNANCY: Primary | ICD-10-CM

## 2020-02-27 PROCEDURE — 99213 OFFICE O/P EST LOW 20 MIN: CPT | Mod: TH,S$PBB,, | Performed by: OBSTETRICS & GYNECOLOGY

## 2020-02-27 PROCEDURE — 99999 PR PBB SHADOW E&M-EST. PATIENT-LVL III: CPT | Mod: PBBFAC,,, | Performed by: OBSTETRICS & GYNECOLOGY

## 2020-02-27 PROCEDURE — 99999 PR PBB SHADOW E&M-EST. PATIENT-LVL III: ICD-10-PCS | Mod: PBBFAC,,, | Performed by: OBSTETRICS & GYNECOLOGY

## 2020-02-27 PROCEDURE — 99213 PR OFFICE/OUTPT VISIT, EST, LEVL III, 20-29 MIN: ICD-10-PCS | Mod: TH,S$PBB,, | Performed by: OBSTETRICS & GYNECOLOGY

## 2020-02-27 PROCEDURE — 99213 OFFICE O/P EST LOW 20 MIN: CPT | Mod: PBBFAC,PO | Performed by: OBSTETRICS & GYNECOLOGY

## 2020-02-27 NOTE — DISCHARGE SUMMARY
[facility] -   Obstetrics  Discharge Summary      Patient Name: Karyn Wells  MRN: [id number]  Admission Date: 2020  Hospital Length of Stay: 2 days  Discharge Date and Time: 2020  3:45 PM  Attending Physician: No att. providers found   Discharging Provider: Olivia Celaya MD   Primary Care Provider: Primary Doctor No    HPI: 19 y /o  at 34 wks Presents per EMS from [facility] with pyelonephritis ; Pt reports symptoms of rt cva tenderness started yesterday.  Denies vaginal bleeding, +fetal movement    * No surgery found *     Hospital Course:   Patient admitted for IV antibiotic therapy with Rocephin for pyelonephritis.  Clinical improvement noted.  Urine culture positive for E. Coli - pan sensitive  On HD#4 , patient noted to be afebrile and pain level very improved.  Stable for discharge to home.         Final Active Diagnoses:    Diagnosis Date Noted POA    PRINCIPAL PROBLEM:  Pyelonephritis [N12] 2020 Yes    Other chest pain [R07.89] 2020 No    Sepsis [A41.9] 2020 Yes    Premature uterine contractions [O47.9] 2020 Yes      Problems Resolved During this Admission:    Diagnosis Date Noted Date Resolved POA    Decreased fetal movement [O36.8190] 2020 Yes          Immunizations     None          This patient has no babies on file.  Pending Diagnostic Studies:     None          Follow Up:  Follow-up Information     Follow up In 1 week.    Why:  OB Visit               Patient Instructions:      Call MD for:  temperature >100.4     Call MD for:  persistent nausea and vomiting or diarrhea     Call MD for:  severe uncontrolled pain     Call MD for:  difficulty breathing or increased cough     Call MD for:  persistent dizziness, light-headedness, or visual disturbances     Call MD for:   Order Comments: Excessively heavy bleeding.  Call if saturating 1 large pad per hour more.     Medications:  Discharge Medication List as  of 2/21/2020  3:23 PM      START taking these medications    Details   amoxicillin-clavulanate 875-125mg (AUGMENTIN) 875-125 mg per tablet Take 1 tablet by mouth every 12 (twelve) hours. for 10 days, Starting Fri 2/21/2020, Until Mon 3/2/2020, Normal      HYDROcodone-acetaminophen (NORCO) 5-325 mg per tablet Take 1 tablet by mouth every 6 (six) hours as needed for Pain., Starting Fri 2/21/2020, Normal         CONTINUE these medications which have NOT CHANGED    Details   hydrOXYzine pamoate (VISTARIL) 25 MG Cap Take 2 capsules (50 mg total) by mouth 3 (three) times daily as needed., Starting Thu 1/23/2020, Normal      prenatal vit-iron fum-folic ac (PRENATAL VITAMIN) 27 mg iron- 0.8 mg Tab Take 1 tablet by mouth once daily., Starting Sun 9/8/2019, Until Mon 9/7/2020, OTC         STOP taking these medications       benzonatate (TESSALON) 100 MG capsule Comments:   Reason for Stopping:               Olivia Celaya MD  Obstetrics  [facility]

## 2020-02-27 NOTE — PROGRESS NOTES
Recovering from  hospitalization on 2/20/20- septic pyelonephritis. States feels 100% better now.  Denies any fever/flank pain.   Still has a few days of antibiotics left to complete.  TDAP next week.  No s/s pre-e or ptl.  Excellent fm/fh.  Instructed on labor precautions.   No lof, bleeding, spotting or cramping.   Instructed on GBS collection for next week.   Getting anxious for birth.   RTC 1 wk with growth scan.

## 2020-03-05 ENCOUNTER — PROCEDURE VISIT (OUTPATIENT)
Dept: OBSTETRICS AND GYNECOLOGY | Facility: CLINIC | Age: 20
End: 2020-03-05
Payer: MEDICAID

## 2020-03-05 ENCOUNTER — ROUTINE PRENATAL (OUTPATIENT)
Dept: OBSTETRICS AND GYNECOLOGY | Facility: CLINIC | Age: 20
End: 2020-03-05
Payer: MEDICAID

## 2020-03-05 VITALS — DIASTOLIC BLOOD PRESSURE: 72 MMHG | BODY MASS INDEX: 28.2 KG/M2 | SYSTOLIC BLOOD PRESSURE: 116 MMHG | WEIGHT: 159.19 LBS

## 2020-03-05 DIAGNOSIS — O26.619 CHOLELITHIASIS AFFECTING PREGNANCY, ANTEPARTUM: ICD-10-CM

## 2020-03-05 DIAGNOSIS — Z86.19 HISTORY OF SEPSIS: ICD-10-CM

## 2020-03-05 DIAGNOSIS — Z36.4 ULTRASOUND FOR ANTENATAL SCREENING FOR FETAL GROWTH RESTRICTION: ICD-10-CM

## 2020-03-05 DIAGNOSIS — Z3A.35 35 WEEKS GESTATION OF PREGNANCY: ICD-10-CM

## 2020-03-05 DIAGNOSIS — K80.20 CHOLELITHIASIS AFFECTING PREGNANCY, ANTEPARTUM: ICD-10-CM

## 2020-03-05 DIAGNOSIS — Z34.93 NORMAL PREGNANCY IN THIRD TRIMESTER: Primary | ICD-10-CM

## 2020-03-05 PROCEDURE — 87081 CULTURE SCREEN ONLY: CPT

## 2020-03-05 PROCEDURE — 76816 PR  US,PREGNANT UTERUS,F/U,TRANSABD APP: ICD-10-PCS | Mod: 26,59,S$PBB, | Performed by: OBSTETRICS & GYNECOLOGY

## 2020-03-05 PROCEDURE — 76819 FETAL BIOPHYS PROFIL W/O NST: CPT | Mod: 59,PBBFAC,PO | Performed by: OBSTETRICS & GYNECOLOGY

## 2020-03-05 PROCEDURE — 76819 FETAL BIOPHYS PROFIL W/O NST: CPT | Mod: 26,59,S$PBB, | Performed by: OBSTETRICS & GYNECOLOGY

## 2020-03-05 PROCEDURE — 99999 PR PBB SHADOW E&M-EST. PATIENT-LVL II: CPT | Mod: PBBFAC,,, | Performed by: ADVANCED PRACTICE MIDWIFE

## 2020-03-05 PROCEDURE — 76816 OB US FOLLOW-UP PER FETUS: CPT | Mod: 59,PBBFAC,PO | Performed by: OBSTETRICS & GYNECOLOGY

## 2020-03-05 PROCEDURE — 87147 CULTURE TYPE IMMUNOLOGIC: CPT

## 2020-03-05 PROCEDURE — 76819 PR US, OB, FETAL BIOPHYSICAL, W/O NST: ICD-10-PCS | Mod: 26,59,S$PBB, | Performed by: OBSTETRICS & GYNECOLOGY

## 2020-03-05 PROCEDURE — 76816 OB US FOLLOW-UP PER FETUS: CPT | Mod: 26,59,S$PBB, | Performed by: OBSTETRICS & GYNECOLOGY

## 2020-03-05 PROCEDURE — 87184 SC STD DISK METHOD PER PLATE: CPT

## 2020-03-05 PROCEDURE — 99212 OFFICE O/P EST SF 10 MIN: CPT | Mod: PBBFAC,PO,25 | Performed by: ADVANCED PRACTICE MIDWIFE

## 2020-03-05 PROCEDURE — 99999 PR PBB SHADOW E&M-EST. PATIENT-LVL II: ICD-10-PCS | Mod: PBBFAC,,, | Performed by: ADVANCED PRACTICE MIDWIFE

## 2020-03-05 PROCEDURE — 99212 PR OFFICE/OUTPT VISIT, EST, LEVL II, 10-19 MIN: ICD-10-PCS | Mod: TH,S$PBB,, | Performed by: ADVANCED PRACTICE MIDWIFE

## 2020-03-05 PROCEDURE — 99212 OFFICE O/P EST SF 10 MIN: CPT | Mod: TH,S$PBB,, | Performed by: ADVANCED PRACTICE MIDWIFE

## 2020-03-05 NOTE — PROGRESS NOTES
Finishing antibiotics  Encouraged to see surgeon for gallbladder removal consult appointment made  GBS done  Kick counts baby active  sono done 20% 5#12oz  MVP 8.4cm BPP 8/8  Birth control options discussed

## 2020-03-10 PROBLEM — O99.820 GBS (GROUP B STREPTOCOCCUS CARRIER), +RV CULTURE, CURRENTLY PREGNANT: Status: ACTIVE | Noted: 2020-03-10

## 2020-03-10 LAB — BACTERIA SPEC AEROBE CULT: ABNORMAL

## 2020-03-12 ENCOUNTER — ROUTINE PRENATAL (OUTPATIENT)
Dept: OBSTETRICS AND GYNECOLOGY | Facility: CLINIC | Age: 20
End: 2020-03-12
Payer: MEDICAID

## 2020-03-12 VITALS — DIASTOLIC BLOOD PRESSURE: 78 MMHG | WEIGHT: 159.19 LBS | SYSTOLIC BLOOD PRESSURE: 112 MMHG | BODY MASS INDEX: 28.2 KG/M2

## 2020-03-12 DIAGNOSIS — F41.9 ANXIETY: ICD-10-CM

## 2020-03-12 DIAGNOSIS — Z3A.37 37 WEEKS GESTATION OF PREGNANCY: Primary | ICD-10-CM

## 2020-03-12 DIAGNOSIS — Z87.19 HISTORY OF GALLSTONES: ICD-10-CM

## 2020-03-12 DIAGNOSIS — R12 HEARTBURN: ICD-10-CM

## 2020-03-12 PROCEDURE — 99213 PR OFFICE/OUTPT VISIT, EST, LEVL III, 20-29 MIN: ICD-10-PCS | Mod: TH,S$PBB,, | Performed by: OBSTETRICS & GYNECOLOGY

## 2020-03-12 PROCEDURE — 99999 PR PBB SHADOW E&M-EST. PATIENT-LVL III: CPT | Mod: PBBFAC,,, | Performed by: OBSTETRICS & GYNECOLOGY

## 2020-03-12 PROCEDURE — 99213 OFFICE O/P EST LOW 20 MIN: CPT | Mod: PBBFAC,PO,25 | Performed by: OBSTETRICS & GYNECOLOGY

## 2020-03-12 PROCEDURE — 90471 IMMUNIZATION ADMIN: CPT | Mod: PBBFAC,PO,VFC

## 2020-03-12 PROCEDURE — 99213 OFFICE O/P EST LOW 20 MIN: CPT | Mod: TH,S$PBB,, | Performed by: OBSTETRICS & GYNECOLOGY

## 2020-03-12 PROCEDURE — 99999 PR PBB SHADOW E&M-EST. PATIENT-LVL III: ICD-10-PCS | Mod: PBBFAC,,, | Performed by: OBSTETRICS & GYNECOLOGY

## 2020-03-12 NOTE — PROGRESS NOTES
GBS+. Patient aware and tx in labor discussed.  Pelvic cramping.  VE: closed/60/-2. Baby entering true pelvis. Cervix mid position/soft.  Good fm/fh.   Reassuring [facility]'s  TDAP today.   Nexplanon infor given. Sign for device next ov.  Reviewed labor precautions. Has ph. #s / directions to [facility].  Await call from surgery for gallstone consult. No pain at this time.  Severe heartburn. Will start OTC Prilosec as directed.     The skin of the suprapubic region was evaluated and appears intact, with no redness, lesions or skin abrasions.  Counseled the patient to shower daily and to wash this area with an antibacterial soap such as Dial daily.  Advised her to not shave the hair from this area from now until after delivery.  I also counseled the patient to place antibacterial hand soap in all her bathrooms and kitchen at home to help facilitate proper hand hygiene practices before and after delivery.    Breastfeeding information and preparedness discussed at length.   Labor precautions.   RTC 1 wk.

## 2020-03-12 NOTE — NURSING
After using two patient identifiers and reviewing allergies and medications. Pt. Received t dap injection. Instructed pt. To wait in clinic for 15 minutes. Pt. Voiced understanding.

## 2020-03-16 ENCOUNTER — PATIENT MESSAGE (OUTPATIENT)
Dept: OBSTETRICS AND GYNECOLOGY | Facility: CLINIC | Age: 20
End: 2020-03-16

## 2020-03-19 ENCOUNTER — ROUTINE PRENATAL (OUTPATIENT)
Dept: OBSTETRICS AND GYNECOLOGY | Facility: CLINIC | Age: 20
End: 2020-03-19
Payer: MEDICAID

## 2020-03-19 VITALS
DIASTOLIC BLOOD PRESSURE: 72 MMHG | SYSTOLIC BLOOD PRESSURE: 124 MMHG | WEIGHT: 163.38 LBS | BODY MASS INDEX: 28.94 KG/M2

## 2020-03-19 DIAGNOSIS — Z87.448 HISTORY OF PYELONEPHRITIS: Primary | ICD-10-CM

## 2020-03-19 PROBLEM — O47.00 PREMATURE UTERINE CONTRACTIONS: Status: RESOLVED | Noted: 2020-02-18 | Resolved: 2020-03-19

## 2020-03-19 LAB
BACTERIA #/AREA URNS AUTO: ABNORMAL /HPF
BILIRUB UR QL STRIP: NEGATIVE
CLARITY UR REFRACT.AUTO: ABNORMAL
COLOR UR AUTO: YELLOW
GLUCOSE UR QL STRIP: NEGATIVE
HGB UR QL STRIP: ABNORMAL
HYALINE CASTS UR QL AUTO: 0 /LPF
KETONES UR QL STRIP: NEGATIVE
LEUKOCYTE ESTERASE UR QL STRIP: ABNORMAL
MICROSCOPIC COMMENT: ABNORMAL
NITRITE UR QL STRIP: POSITIVE
PH UR STRIP: 6 [PH] (ref 5–8)
PROT UR QL STRIP: ABNORMAL
RBC #/AREA URNS AUTO: 4 /HPF (ref 0–4)
SP GR UR STRIP: 1.02 (ref 1–1.03)
URN SPEC COLLECT METH UR: ABNORMAL
UROBILINOGEN UR STRIP-ACNC: NEGATIVE EU/DL
WBC #/AREA URNS AUTO: 32 /HPF (ref 0–5)

## 2020-03-19 PROCEDURE — 99213 OFFICE O/P EST LOW 20 MIN: CPT | Mod: TH,S$PBB,, | Performed by: OBSTETRICS & GYNECOLOGY

## 2020-03-19 PROCEDURE — 99999 PR PBB SHADOW E&M-EST. PATIENT-LVL II: CPT | Mod: PBBFAC,,, | Performed by: OBSTETRICS & GYNECOLOGY

## 2020-03-19 PROCEDURE — 99212 OFFICE O/P EST SF 10 MIN: CPT | Mod: PBBFAC,PO | Performed by: OBSTETRICS & GYNECOLOGY

## 2020-03-19 PROCEDURE — 99999 PR PBB SHADOW E&M-EST. PATIENT-LVL II: ICD-10-PCS | Mod: PBBFAC,,, | Performed by: OBSTETRICS & GYNECOLOGY

## 2020-03-19 PROCEDURE — 99213 PR OFFICE/OUTPT VISIT, EST, LEVL III, 20-29 MIN: ICD-10-PCS | Mod: TH,S$PBB,, | Performed by: OBSTETRICS & GYNECOLOGY

## 2020-03-19 PROCEDURE — 81000 URINALYSIS NONAUTO W/SCOPE: CPT | Mod: PO

## 2020-03-21 NOTE — PROGRESS NOTES
Patient getting anxious /ready for birth.   No increase in anxiety/panic attacks. Using Vistaril only prn.  Good fm/fh.  No lof, spotting or cramping.  Occ cramping.   VE as noted.   Aware of labor s/s.    Patient viewed [employer] video, Nourish and was provided with [facility] handouts: A Good Latch and Tips for a More Comfortable Labor. Discussed nonpharmacological pain relief methods for labor, techniques and benefits of effective breastfeeding position and latch, and basic breastfeeding management. Encouraged patient to attend [facility]s Prenatal Breastfeeding Class and to download the [employer] mobile odalys if she has not already done so. Patient verbalizes understanding.     Coronovirus information/education provided regarding droplet transmission.  Information regarding meticulous attention to good hand hygiene, length of time to wash ~ 20 seconds minimum as well as use of hand sanitizers.  Limit hand contact with unknown persons and always minimize touching of own face to reduce occurrence of acquiring viral infections. Social distancing when at all possible to limit public exposure of self/family members.  Be aware if develops fever >100.4, with cough, headaches should call office immediately for further instructions for evaluation-follow up at appropriate site. Always cough into elbow and wash hands immediately after.  Understands screening protocols prior to visits will continue.  At this time visitor restrictions are in place and being inforced. No clinic visitors unless minor and person needing help with ADL's.  Labor and delivery allowing only 1 attendant at this time for birth stay.   Has directions and #'s for hospital.   Patient feeling well and ready to have baby.  RTC 1 wk.

## 2020-03-23 ENCOUNTER — PATIENT MESSAGE (OUTPATIENT)
Dept: OBSTETRICS AND GYNECOLOGY | Facility: CLINIC | Age: 20
End: 2020-03-23

## 2020-03-23 ENCOUNTER — TELEPHONE (OUTPATIENT)
Dept: OBSTETRICS AND GYNECOLOGY | Facility: HOSPITAL | Age: 20
End: 2020-03-23

## 2020-03-23 RX ORDER — NITROFURANTOIN (MACROCRYSTALS) 100 MG/1
100 CAPSULE ORAL
Qty: 14 CAPSULE | Refills: 0 | Status: SHIPPED | OUTPATIENT
Start: 2020-03-23 | End: 2020-03-30

## 2020-03-23 NOTE — TELEPHONE ENCOUNTER
Patient with +Urine.  Attempted to call with results.  No answer and no ability to call and inform RX called to pharmacy on file.    Will leave staff message to try and re-attempt notification for above.     Rx sent to pharmacy on file

## 2020-03-26 ENCOUNTER — ROUTINE PRENATAL (OUTPATIENT)
Dept: OBSTETRICS AND GYNECOLOGY | Facility: CLINIC | Age: 20
End: 2020-03-26
Payer: MEDICAID

## 2020-03-26 VITALS
SYSTOLIC BLOOD PRESSURE: 124 MMHG | WEIGHT: 162.94 LBS | DIASTOLIC BLOOD PRESSURE: 80 MMHG | BODY MASS INDEX: 28.86 KG/M2

## 2020-03-26 DIAGNOSIS — Z3A.39 39 WEEKS GESTATION OF PREGNANCY: Primary | ICD-10-CM

## 2020-03-26 DIAGNOSIS — N39.0 URINARY TRACT INFECTION WITHOUT HEMATURIA, SITE UNSPECIFIED: ICD-10-CM

## 2020-03-26 PROCEDURE — 99999 PR PBB SHADOW E&M-EST. PATIENT-LVL II: CPT | Mod: PBBFAC,,, | Performed by: OBSTETRICS & GYNECOLOGY

## 2020-03-26 PROCEDURE — 99212 OFFICE O/P EST SF 10 MIN: CPT | Mod: PBBFAC,PO | Performed by: OBSTETRICS & GYNECOLOGY

## 2020-03-26 PROCEDURE — 99999 PR PBB SHADOW E&M-EST. PATIENT-LVL II: ICD-10-PCS | Mod: PBBFAC,,, | Performed by: OBSTETRICS & GYNECOLOGY

## 2020-03-26 PROCEDURE — 99213 PR OFFICE/OUTPT VISIT, EST, LEVL III, 20-29 MIN: ICD-10-PCS | Mod: TH,S$PBB,, | Performed by: OBSTETRICS & GYNECOLOGY

## 2020-03-26 PROCEDURE — 99213 OFFICE O/P EST LOW 20 MIN: CPT | Mod: TH,S$PBB,, | Performed by: OBSTETRICS & GYNECOLOGY

## 2020-03-26 NOTE — PROGRESS NOTES
Noted UTI on 3/19. Did  RX and taking as directed. No fever, flank pain or n/v.   No increase in ctx, but increased vaginal pressure.  VE: 1/70/0 station/ vertex, mid position/ soft. Baby settled deep into pelvis now.   No vaginal discharge, no lof, bleeding, spotting or cramping.   Ready for baby.  Labor precautions cont.  FKC's reassuring.  Aware GBS + and need for tx in labor.  RTC 1 wk if undelivered.

## 2020-03-30 ENCOUNTER — PATIENT MESSAGE (OUTPATIENT)
Dept: OBSTETRICS AND GYNECOLOGY | Facility: CLINIC | Age: 20
End: 2020-03-30

## 2020-03-30 ENCOUNTER — HOSPITAL ENCOUNTER (OUTPATIENT)
Facility: HOSPITAL | Age: 20
Discharge: HOME | End: 2020-03-30
Attending: OBSTETRICS & GYNECOLOGY | Admitting: OBSTETRICS & GYNECOLOGY
Payer: MEDICAID

## 2020-03-30 VITALS — DIASTOLIC BLOOD PRESSURE: 59 MMHG | HEART RATE: 91 BPM | OXYGEN SATURATION: 99 % | SYSTOLIC BLOOD PRESSURE: 101 MMHG

## 2020-03-30 DIAGNOSIS — N30.00 ACUTE CYSTITIS WITHOUT HEMATURIA: ICD-10-CM

## 2020-03-30 DIAGNOSIS — N12 PYELONEPHRITIS: ICD-10-CM

## 2020-03-30 DIAGNOSIS — O47.9 THREATENED LABOR AT TERM: ICD-10-CM

## 2020-03-30 DIAGNOSIS — F41.1 GENERALIZED ANXIETY DISORDER WITH PANIC ATTACKS: Primary | ICD-10-CM

## 2020-03-30 DIAGNOSIS — F41.0 GENERALIZED ANXIETY DISORDER WITH PANIC ATTACKS: Primary | ICD-10-CM

## 2020-03-30 LAB
BACTERIA #/AREA URNS HPF: ABNORMAL /HPF
BILIRUB UR QL STRIP: NEGATIVE
CLARITY UR: CLEAR
COLOR UR: YELLOW
GLUCOSE UR QL STRIP: NEGATIVE
HGB UR QL STRIP: NEGATIVE
KETONES UR QL STRIP: NEGATIVE
LEUKOCYTE ESTERASE UR QL STRIP: ABNORMAL
MICROSCOPIC COMMENT: ABNORMAL
NITRITE UR QL STRIP: POSITIVE
PH UR STRIP: 7 [PH] (ref 5–8)
PROT UR QL STRIP: ABNORMAL
SP GR UR STRIP: 1.01 (ref 1–1.03)
URN SPEC COLLECT METH UR: ABNORMAL
UROBILINOGEN UR STRIP-ACNC: NEGATIVE EU/DL
WBC #/AREA URNS HPF: 7 /HPF (ref 0–5)

## 2020-03-30 PROCEDURE — 99211 OFF/OP EST MAY X REQ PHY/QHP: CPT | Mod: TH

## 2020-03-30 PROCEDURE — 81000 URINALYSIS NONAUTO W/SCOPE: CPT

## 2020-03-30 PROCEDURE — 99213 OFFICE O/P EST LOW 20 MIN: CPT | Mod: 25,TH,, | Performed by: ADVANCED PRACTICE MIDWIFE

## 2020-03-30 PROCEDURE — 59025 FETAL NON-STRESS TEST: CPT | Mod: 26,,, | Performed by: ADVANCED PRACTICE MIDWIFE

## 2020-03-30 PROCEDURE — 59025 OBTAIN FETAL NONSTRESS TEST (NST): ICD-10-PCS | Mod: 26,,, | Performed by: ADVANCED PRACTICE MIDWIFE

## 2020-03-30 PROCEDURE — G0378 HOSPITAL OBSERVATION PER HR: HCPCS

## 2020-03-30 PROCEDURE — 59025 FETAL NON-STRESS TEST: CPT

## 2020-03-30 PROCEDURE — 99213 PR OFFICE/OUTPT VISIT, EST, LEVL III, 20-29 MIN: ICD-10-PCS | Mod: 25,TH,, | Performed by: ADVANCED PRACTICE MIDWIFE

## 2020-03-30 RX ORDER — ONDANSETRON 8 MG/1
8 TABLET, ORALLY DISINTEGRATING ORAL EVERY 8 HOURS PRN
Status: DISCONTINUED | OUTPATIENT
Start: 2020-03-30 | End: 2020-03-30 | Stop reason: HOSPADM

## 2020-03-30 RX ORDER — NITROFURANTOIN 25; 75 MG/1; MG/1
100 CAPSULE ORAL 2 TIMES DAILY
Qty: 20 CAPSULE | Refills: 0 | Status: SHIPPED | OUTPATIENT
Start: 2020-03-30 | End: 2020-04-09

## 2020-03-30 RX ORDER — SODIUM CHLORIDE 9 MG/ML
INJECTION, SOLUTION INTRAVENOUS CONTINUOUS
Status: DISCONTINUED | OUTPATIENT
Start: 2020-03-30 | End: 2020-03-30 | Stop reason: HOSPADM

## 2020-03-30 RX ORDER — ACETAMINOPHEN 500 MG
500 TABLET ORAL EVERY 6 HOURS PRN
Status: DISCONTINUED | OUTPATIENT
Start: 2020-03-30 | End: 2020-03-30 | Stop reason: HOSPADM

## 2020-03-30 NOTE — HOSPITAL COURSE
Observation  R/o labor   RNST  UA, + nitrates, macrobid sent to house, instructed on drinking lots of water, f/u with SAUMYA Villalta Wednesday

## 2020-03-30 NOTE — DISCHARGE INSTRUCTIONS

## 2020-03-30 NOTE — DISCHARGE SUMMARY
[facility] -   Obstetrics  Discharge Summary      Patient Name: Karyn Wells  MRN: [id number]  Admission Date: 3/30/2020  Hospital Length of Stay: 0 days  Discharge Date and Time:  03/30/2020 6:52 PM  Attending Physician: Anjelica Washington MD   Discharging Provider: Fabricio Kat CNM   Primary Care Provider: Primary Doctor No    HPI: C/o lower abdominal pain, admits to not drinking water, drinks apple juice and caprisun     FHT: 135 bpm, Cat 1 (reassuring)  TOCO:  Rare    * No surgery found *     Hospital Course:   Observation  R/o labor   RNST  UA, + nitrates, macrobid sent to house, instructed on drinking lots of water, f/u with SAUMYA Villalta Wednesday         Final Active Diagnoses:    Diagnosis Date Noted POA    PRINCIPAL PROBLEM:  Urinary tract infection without hematuria [N39.0] 03/19/2020 Yes      Problems Resolved During this Admission:    Diagnosis Date Noted Date Resolved POA    Threatened labor at term [O47.9] 03/30/2020 03/30/2020 No        Labs: All labs within the past 24 hours have been reviewed    Immunizations     None          This patient has no babies on file.  Pending Diagnostic Studies:     None          Discharged Condition: good    Disposition: Home or Self Care    Follow Up:  Follow-up Information     Ambar Hazel CNM. Go on 4/1/2020.    Specialty:  Obstetrics and Gynecology  Contact information:  [address] [place] [address] [address] [place] [address] [phone]                 Patient Instructions:   No discharge procedures on file.  Medications:  Current Discharge Medication List      CONTINUE these medications which have NOT CHANGED    Details   hydrOXYzine pamoate (VISTARIL) 25 MG Cap Take 2 capsules (50 mg total) by mouth 3 (three) times daily as needed.  Qty: 90 capsule, Refills: 2    Associated Diagnoses: Panic attack as reaction to stress      nitrofurantoin (MACRODANTIN) 100 MG capsule Take 1 capsule (100 mg total) by mouth 2 times daily 2 hours after meal.  for 7 days  Qty: 14 capsule, Refills: 0      prenatal vit-iron fum-folic ac (PRENATAL VITAMIN) 27 mg iron- 0.8 mg Tab Take 1 tablet by mouth once daily.  Qty: 30 tablet, Refills: 11             Fabricio Kat CNM  Obstetrics  [facility]

## 2020-03-30 NOTE — SUBJECTIVE & OBJECTIVE
Obstetric HPI:  Patient reports Intensity: moderate contractions, active fetal movement, No vaginal bleeding , No loss of fluid     This pregnancy has been complicated by   Pyelonephritis one month ago during this pregnancy  Anxiety with panic attacks, on medications    OB History    Para Term  AB Living   2 0 0 0 1 0   SAB TAB Ectopic Multiple Live Births   1 0 0 0 0      # Outcome Date GA Lbr Fidel/2nd Weight Sex Delivery Anes PTL Lv   2 Current            1 SAB              Past Medical History:   Diagnosis Date    Acute conjunctivitis of left eye 2020    Decreased fetal movement 2020    Seizure     SVT (supraventricular tachycardia)      No past surgical history on file.    PTA Medications   Medication Sig    hydrOXYzine pamoate (VISTARIL) 25 MG Cap Take 2 capsules (50 mg total) by mouth 3 (three) times daily as needed.    nitrofurantoin (MACRODANTIN) 100 MG capsule Take 1 capsule (100 mg total) by mouth 2 times daily 2 hours after meal. for 7 days    prenatal vit-iron fum-folic ac (PRENATAL VITAMIN) 27 mg iron- 0.8 mg Tab Take 1 tablet by mouth once daily.       Review of patient's allergies indicates:  No Known Allergies     Family History     None        Tobacco Use    Smoking status: Never Smoker    Smokeless tobacco: Never Used   Substance and Sexual Activity    Alcohol use: No     Frequency: Never    Drug use: No    Sexual activity: Yes     Partners: Male     Review of Systems   Gastrointestinal: Positive for abdominal pain.   Genitourinary: Negative for vaginal bleeding and vaginal discharge.   All other systems reviewed and are negative.     Objective:     Vital Signs (Most Recent):    Vital Signs (24h Range):           There is no height or weight on file to calculate BMI.    FHT: 130bpm, Cat 1 (reassuring)  TOCO:  Q 2-3 minutes    Physical Exam:   Constitutional: She is oriented to person, place, and time. She appears well-developed and well-nourished.    HENT:   Head:  Normocephalic.     Neck: Normal range of motion.    Cardiovascular: Normal rate.     Pulmonary/Chest: Effort normal.        Abdominal: Soft.   Non-Tender             Musculoskeletal: Normal range of motion.       Neurological: She is alert and oriented to person, place, and time.    Skin: Skin is warm and dry.    Psychiatric: She has a normal mood and affect. Her behavior is normal. Judgment and thought content normal.       Cervix: per RN  Dilation:  1.5cm  Effacement:  60  Station: -1  Presentation: Vertex     Significant Labs:  Lab Results   Component Value Date    GROUPTRH AB POS 12/02/2019    HEPBSAG Negative 12/02/2019    STREPBCULT (A) 03/05/2020     STREPTOCOCCUS AGALACTIAE (GROUP B)  Beta-hemolytic streptococci are routinely susceptible to   penicillins,cephalosporins and carbapenems.         I have personallly reviewed all pertinent lab results from the last 24 hours.

## 2020-03-30 NOTE — H&P
[facility] -   Obstetrics  History & Physical    Patient Name: Karyn Wells  MRN: [id number]  Admission Date: 3/30/2020  Primary Care Provider: Primary Doctor No    Subjective:     Principal Problem:Threatened labor at term    History of Present Illness:  C/o lower abdominal pain, admits to not drinking water, drinks apple juice and caprisun     Obstetric HPI:  Patient reports Intensity: moderate contractions, active fetal movement, No vaginal bleeding , No loss of fluid     This pregnancy has been complicated by   Pyelonephritis one month ago during this pregnancy  Anxiety with panic attacks, on medications    OB History    Para Term  AB Living   2 0 0 0 1 0   SAB TAB Ectopic Multiple Live Births   1 0 0 0 0      # Outcome Date GA Lbr Fidel/2nd Weight Sex Delivery Anes PTL Lv   2 Current            1 SAB              Past Medical History:   Diagnosis Date    Acute conjunctivitis of left eye 2020    Decreased fetal movement 2020    Seizure     SVT (supraventricular tachycardia)      No past surgical history on file.    PTA Medications   Medication Sig    hydrOXYzine pamoate (VISTARIL) 25 MG Cap Take 2 capsules (50 mg total) by mouth 3 (three) times daily as needed.    nitrofurantoin (MACRODANTIN) 100 MG capsule Take 1 capsule (100 mg total) by mouth 2 times daily 2 hours after meal. for 7 days    prenatal vit-iron fum-folic ac (PRENATAL VITAMIN) 27 mg iron- 0.8 mg Tab Take 1 tablet by mouth once daily.       Review of patient's allergies indicates:  No Known Allergies     Family History     None        Tobacco Use    Smoking status: Never Smoker    Smokeless tobacco: Never Used   Substance and Sexual Activity    Alcohol use: No     Frequency: Never    Drug use: No    Sexual activity: Yes     Partners: Male     Review of Systems   Gastrointestinal: Positive for abdominal pain.   Genitourinary: Negative for vaginal bleeding and vaginal discharge.   All other  systems reviewed and are negative.     Objective:     Vital Signs (Most Recent):    Vital Signs (24h Range):           There is no height or weight on file to calculate BMI.    FHT: 130bpm, Cat 1 (reassuring)  TOCO:  Q 2-3 minutes    Physical Exam:   Constitutional: She is oriented to person, place, and time. She appears well-developed and well-nourished.    HENT:   Head: Normocephalic.     Neck: Normal range of motion.    Cardiovascular: Normal rate.     Pulmonary/Chest: Effort normal.        Abdominal: Soft.   Non-Tender             Musculoskeletal: Normal range of motion.       Neurological: She is alert and oriented to person, place, and time.    Skin: Skin is warm and dry.    Psychiatric: She has a normal mood and affect. Her behavior is normal. Judgment and thought content normal.       Cervix: per RN  Dilation:  1.5cm  Effacement:  60  Station: -1  Presentation: Vertex     Significant Labs:  Lab Results   Component Value Date    GROUPTRH AB POS 2019    HEPBSAG Negative 2019    STREPBCULT (A) 2020     STREPTOCOCCUS AGALACTIAE (GROUP B)  Beta-hemolytic streptococci are routinely susceptible to   penicillins,cephalosporins and carbapenems.         I have personallly reviewed all pertinent lab results from the last 24 hours.    Assessment/Plan:     19 y.o. female  at 39w6d for:    * Threatened labor at term  Observation   R/o labor  UA  NST      Urinary tract infection without hematuria  UA on admit    Generalized anxiety disorder with panic attacks  Vistaril TID CHRISSY Kat CNM  Obstetrics  [facility]

## 2020-03-30 NOTE — PROCEDURES
Karyn Wells is a 19 y.o. female patient.    Pulse: 91 (03/30/20 1700)  BP: (!) 101/59 (03/30/20 1700)  SpO2: 99 % (03/30/20 1700)       Obtain Fetal nonstress test (NST)  Date/Time: 3/30/2020 6:48 PM  Performed by: Fabricio Kat CNM  Authorized by: Fabricio Kat CNM     Nonstress Test:     Variability:  6-25 BPM    Decelerations:  None    Accelerations:  15 bpm    Acoustic Stimulator: No      Baseline:  135    Uterine Irritability: No      Contractions:  Irregular  Biophysical Profile:     Nonstress Test Interpretation: reactive      Overall Impression:  Reassuring  Post-procedure:     Patient tolerance:  Patient tolerated the procedure well with no immediate complications        Fabricio Kat  3/30/2020

## 2020-04-01 ENCOUNTER — ROUTINE PRENATAL (OUTPATIENT)
Dept: OBSTETRICS AND GYNECOLOGY | Facility: CLINIC | Age: 20
End: 2020-04-01
Payer: MEDICAID

## 2020-04-01 VITALS
BODY MASS INDEX: 28.66 KG/M2 | DIASTOLIC BLOOD PRESSURE: 80 MMHG | WEIGHT: 161.81 LBS | SYSTOLIC BLOOD PRESSURE: 124 MMHG

## 2020-04-01 DIAGNOSIS — O48.0 POST-TERM PREGNANCY, 40-42 WEEKS OF GESTATION: Primary | ICD-10-CM

## 2020-04-01 PROCEDURE — 99212 OFFICE O/P EST SF 10 MIN: CPT | Mod: PBBFAC,TH | Performed by: MIDWIFE

## 2020-04-01 PROCEDURE — 99213 PR OFFICE/OUTPT VISIT, EST, LEVL III, 20-29 MIN: ICD-10-PCS | Mod: TH,S$PBB,, | Performed by: MIDWIFE

## 2020-04-01 PROCEDURE — 99999 PR PBB SHADOW E&M-EST. PATIENT-LVL II: ICD-10-PCS | Mod: PBBFAC,,, | Performed by: MIDWIFE

## 2020-04-01 PROCEDURE — 99213 OFFICE O/P EST LOW 20 MIN: CPT | Mod: TH,S$PBB,, | Performed by: MIDWIFE

## 2020-04-01 PROCEDURE — 99999 PR PBB SHADOW E&M-EST. PATIENT-LVL II: CPT | Mod: PBBFAC,,, | Performed by: MIDWIFE

## 2020-04-01 RX ORDER — SIMETHICONE 80 MG
1 TABLET,CHEWABLE ORAL 4 TIMES DAILY PRN
Status: CANCELLED | OUTPATIENT
Start: 2020-04-01

## 2020-04-01 RX ORDER — MISOPROSTOL 100 UG/1
600 TABLET ORAL
Status: CANCELLED | OUTPATIENT
Start: 2020-04-01

## 2020-04-01 RX ORDER — OXYTOCIN/RINGER'S LACTATE 30/500 ML
41.7 PLASTIC BAG, INJECTION (ML) INTRAVENOUS CONTINUOUS
Status: CANCELLED | OUTPATIENT
Start: 2020-04-01 | End: 2020-04-01

## 2020-04-01 RX ORDER — ONDANSETRON 4 MG/1
8 TABLET, ORALLY DISINTEGRATING ORAL EVERY 8 HOURS PRN
Status: CANCELLED | OUTPATIENT
Start: 2020-04-01

## 2020-04-01 RX ORDER — BUTORPHANOL TARTRATE 1 MG/ML
1 INJECTION INTRAMUSCULAR; INTRAVENOUS
Status: CANCELLED | OUTPATIENT
Start: 2020-04-01

## 2020-04-01 RX ORDER — ETONOGESTREL 68 MG/1
IMPLANT SUBCUTANEOUS
COMMUNITY
Start: 2020-03-26

## 2020-04-01 RX ORDER — SODIUM CHLORIDE 9 MG/ML
INJECTION, SOLUTION INTRAVENOUS
Status: CANCELLED | OUTPATIENT
Start: 2020-04-01

## 2020-04-01 RX ORDER — OXYTOCIN/RINGER'S LACTATE 30/500 ML
2 PLASTIC BAG, INJECTION (ML) INTRAVENOUS CONTINUOUS
Status: CANCELLED | OUTPATIENT
Start: 2020-04-01

## 2020-04-01 RX ORDER — BUTORPHANOL TARTRATE 1 MG/ML
2 INJECTION INTRAMUSCULAR; INTRAVENOUS
Status: CANCELLED | OUTPATIENT
Start: 2020-04-01

## 2020-04-01 RX ORDER — CALCIUM CARBONATE 200(500)MG
500 TABLET,CHEWABLE ORAL 3 TIMES DAILY PRN
Status: CANCELLED | OUTPATIENT
Start: 2020-04-01

## 2020-04-01 RX ORDER — SODIUM CHLORIDE, SODIUM LACTATE, POTASSIUM CHLORIDE, CALCIUM CHLORIDE 600; 310; 30; 20 MG/100ML; MG/100ML; MG/100ML; MG/100ML
INJECTION, SOLUTION INTRAVENOUS CONTINUOUS
Status: CANCELLED | OUTPATIENT
Start: 2020-04-01

## 2020-04-01 RX ORDER — OXYTOCIN/RINGER'S LACTATE 30/500 ML
333 PLASTIC BAG, INJECTION (ML) INTRAVENOUS CONTINUOUS
Status: CANCELLED | OUTPATIENT
Start: 2020-04-01 | End: 2020-04-01

## 2020-04-01 NOTE — PROGRESS NOTES
19 y.o. female  at 40w1d   Reports + FM, denies VB, LOF or regular CTX  Doing well without concerns. No complaints.   Was seen in LD for r/o labor. Diagnosed with a UTI that she bagan her abx for. Currently on day 2 of abx.   TW.5 lbs   VE per pt request with membrane sweep.   Discussed when to go to LD and labor precautions.   Reviewed GBS +, pt aware that she will need abx during labor.  Discussed postdates management and IOL - she prefers to IOL  Scheduled IOL at 41w0d on 20 @ 0000. LD called. Orders placed.   Reviewed warning signs, normal FKCs, labor precautions and how/when to call.  Reviewed COVID-19 policies. No visitors in clinic and only 1 visitor for labor and delivery. Encouraged following CDC recommendations. Encouraged social distancing and frequent hand washing. Discussed use of connected mom in pregnancy.

## 2020-04-04 ENCOUNTER — HOSPITAL ENCOUNTER (INPATIENT)
Facility: HOSPITAL | Age: 20
LOS: 2 days | Discharge: HOME | End: 2020-04-06
Attending: OBSTETRICS & GYNECOLOGY | Admitting: OBSTETRICS & GYNECOLOGY
Payer: MEDICAID

## 2020-04-04 ENCOUNTER — ANESTHESIA (OUTPATIENT)
Dept: OBSTETRICS AND GYNECOLOGY | Facility: HOSPITAL | Age: 20
End: 2020-04-04
Payer: MEDICAID

## 2020-04-04 ENCOUNTER — ANESTHESIA EVENT (OUTPATIENT)
Dept: OBSTETRICS AND GYNECOLOGY | Facility: HOSPITAL | Age: 20
End: 2020-04-04
Payer: MEDICAID

## 2020-04-04 DIAGNOSIS — O48.0 POST-DATES PREGNANCY: ICD-10-CM

## 2020-04-04 DIAGNOSIS — Z37.9 NORMAL LABOR: ICD-10-CM

## 2020-04-04 DIAGNOSIS — O48.0 POST-TERM PREGNANCY, 40-42 WEEKS OF GESTATION: ICD-10-CM

## 2020-04-04 PROBLEM — Z34.90 ENCOUNTER FOR INDUCTION OF LABOR: Status: RESOLVED | Noted: 2020-04-04 | Resolved: 2020-04-04

## 2020-04-04 PROBLEM — Z34.90 ENCOUNTER FOR INDUCTION OF LABOR: Status: ACTIVE | Noted: 2020-04-04

## 2020-04-04 LAB
ABO + RH BLD: NORMAL
ALLENS TEST: ABNORMAL
BASOPHILS # BLD AUTO: 0.03 K/UL (ref 0–0.2)
BASOPHILS NFR BLD: 0.2 % (ref 0–1.9)
BLD GP AB SCN CELLS X3 SERPL QL: NORMAL
DIFFERENTIAL METHOD: ABNORMAL
EOSINOPHIL # BLD AUTO: 0 K/UL (ref 0–0.5)
EOSINOPHIL NFR BLD: 0.3 % (ref 0–8)
ERYTHROCYTE [DISTWIDTH] IN BLOOD BY AUTOMATED COUNT: 13.8 % (ref 11.5–14.5)
HCO3 UR-SCNC: 23.6 MMOL/L (ref 24–28)
HCT VFR BLD AUTO: 31.7 % (ref 37–48.5)
HGB BLD-MCNC: 9.9 G/DL (ref 12–16)
IMM GRANULOCYTES # BLD AUTO: 0.05 K/UL (ref 0–0.04)
IMM GRANULOCYTES NFR BLD AUTO: 0.4 % (ref 0–0.5)
LYMPHOCYTES # BLD AUTO: 1.9 K/UL (ref 1–4.8)
LYMPHOCYTES NFR BLD: 14.8 % (ref 18–48)
MCH RBC QN AUTO: 25.2 PG (ref 27–31)
MCHC RBC AUTO-ENTMCNC: 31.2 G/DL (ref 32–36)
MCV RBC AUTO: 81 FL (ref 82–98)
MONOCYTES # BLD AUTO: 0.7 K/UL (ref 0.3–1)
MONOCYTES NFR BLD: 5.3 % (ref 4–15)
NEUTROPHILS # BLD AUTO: 10.1 K/UL (ref 1.8–7.7)
NEUTROPHILS NFR BLD: 79 % (ref 38–73)
NRBC BLD-RTO: 0 /100 WBC
PCO2 BLDA: 51.1 MMHG (ref 35–45)
PH SMN: 7.27 [PH] (ref 7.35–7.45)
PLATELET # BLD AUTO: 404 K/UL (ref 150–350)
PMV BLD AUTO: 10.5 FL (ref 9.2–12.9)
PO2 BLDA: 15 MMHG (ref 80–100)
POC BE: -3 MMOL/L
POC SATURATED O2: 15 % (ref 95–100)
RBC # BLD AUTO: 3.93 M/UL (ref 4–5.4)
SAMPLE: ABNORMAL
SARS-COV-2 RNA AMPLIFICATION, QUAL: NEGATIVE
WBC # BLD AUTO: 12.8 K/UL (ref 3.9–12.7)

## 2020-04-04 PROCEDURE — 72100002 HC LABOR CARE, 1ST 8 HOURS

## 2020-04-04 PROCEDURE — 86850 RBC ANTIBODY SCREEN: CPT

## 2020-04-04 PROCEDURE — 27800516 HC TRAY, EPIDURAL COMBO: Performed by: ANESTHESIOLOGY

## 2020-04-04 PROCEDURE — 51701 INSERT BLADDER CATHETER: CPT

## 2020-04-04 PROCEDURE — 63600175 PHARM REV CODE 636 W HCPCS: Performed by: NURSE ANESTHETIST, CERTIFIED REGISTERED

## 2020-04-04 PROCEDURE — 72200004 HC VAGINAL DELIVERY LEVEL I

## 2020-04-04 PROCEDURE — 25000003 PHARM REV CODE 250: Performed by: ADVANCED PRACTICE MIDWIFE

## 2020-04-04 PROCEDURE — 25000003 PHARM REV CODE 250: Performed by: NURSE ANESTHETIST, CERTIFIED REGISTERED

## 2020-04-04 PROCEDURE — 82803 BLOOD GASES ANY COMBINATION: CPT

## 2020-04-04 PROCEDURE — 99900035 HC TECH TIME PER 15 MIN (STAT)

## 2020-04-04 PROCEDURE — 63600175 PHARM REV CODE 636 W HCPCS: Performed by: MIDWIFE

## 2020-04-04 PROCEDURE — 62326 NJX INTERLAMINAR LMBR/SAC: CPT | Performed by: NURSE ANESTHETIST, CERTIFIED REGISTERED

## 2020-04-04 PROCEDURE — U0002 COVID-19 LAB TEST NON-CDC: HCPCS

## 2020-04-04 PROCEDURE — 36416 COLLJ CAPILLARY BLOOD SPEC: CPT

## 2020-04-04 PROCEDURE — 59409 PR OBSTETRICAL CARE,VAG DELIV ONLY: ICD-10-PCS | Mod: GB,,, | Performed by: ADVANCED PRACTICE MIDWIFE

## 2020-04-04 PROCEDURE — 59409 OBSTETRICAL CARE: CPT | Mod: GB,,, | Performed by: ADVANCED PRACTICE MIDWIFE

## 2020-04-04 PROCEDURE — 11000001 HC ACUTE MED/SURG PRIVATE ROOM

## 2020-04-04 PROCEDURE — 27200710 HC EPIDURAL INFUSION PUMP SET: Performed by: ANESTHESIOLOGY

## 2020-04-04 PROCEDURE — 85025 COMPLETE CBC W/AUTO DIFF WBC: CPT

## 2020-04-04 RX ORDER — MISOPROSTOL 200 UG/1
600 TABLET ORAL
Status: DISCONTINUED | OUTPATIENT
Start: 2020-04-04 | End: 2020-04-04

## 2020-04-04 RX ORDER — SODIUM CHLORIDE, SODIUM LACTATE, POTASSIUM CHLORIDE, CALCIUM CHLORIDE 600; 310; 30; 20 MG/100ML; MG/100ML; MG/100ML; MG/100ML
INJECTION, SOLUTION INTRAVENOUS CONTINUOUS
Status: DISCONTINUED | OUTPATIENT
Start: 2020-04-04 | End: 2020-04-04

## 2020-04-04 RX ORDER — IBUPROFEN 600 MG/1
600 TABLET ORAL EVERY 6 HOURS
Status: DISCONTINUED | OUTPATIENT
Start: 2020-04-04 | End: 2020-04-06 | Stop reason: HOSPADM

## 2020-04-04 RX ORDER — SIMETHICONE 80 MG
1 TABLET,CHEWABLE ORAL EVERY 6 HOURS PRN
Status: DISCONTINUED | OUTPATIENT
Start: 2020-04-04 | End: 2020-04-06 | Stop reason: HOSPADM

## 2020-04-04 RX ORDER — OXYTOCIN/RINGER'S LACTATE 30/500 ML
95 PLASTIC BAG, INJECTION (ML) INTRAVENOUS ONCE
Status: DISCONTINUED | OUTPATIENT
Start: 2020-04-04 | End: 2020-04-06 | Stop reason: HOSPADM

## 2020-04-04 RX ORDER — ONDANSETRON 8 MG/1
8 TABLET, ORALLY DISINTEGRATING ORAL EVERY 8 HOURS PRN
Status: DISCONTINUED | OUTPATIENT
Start: 2020-04-04 | End: 2020-04-06 | Stop reason: HOSPADM

## 2020-04-04 RX ORDER — HYDROCODONE BITARTRATE AND ACETAMINOPHEN 5; 325 MG/1; MG/1
1 TABLET ORAL EVERY 4 HOURS PRN
Status: DISCONTINUED | OUTPATIENT
Start: 2020-04-04 | End: 2020-04-06 | Stop reason: HOSPADM

## 2020-04-04 RX ORDER — OXYTOCIN/RINGER'S LACTATE 30/500 ML
333 PLASTIC BAG, INJECTION (ML) INTRAVENOUS CONTINUOUS
Status: DISCONTINUED | OUTPATIENT
Start: 2020-04-04 | End: 2020-04-04

## 2020-04-04 RX ORDER — HYDROCORTISONE 25 MG/G
CREAM TOPICAL 3 TIMES DAILY PRN
Status: DISCONTINUED | OUTPATIENT
Start: 2020-04-04 | End: 2020-04-06 | Stop reason: HOSPADM

## 2020-04-04 RX ORDER — SODIUM CHLORIDE 9 MG/ML
INJECTION, SOLUTION INTRAVENOUS
Status: DISCONTINUED | OUTPATIENT
Start: 2020-04-04 | End: 2020-04-04

## 2020-04-04 RX ORDER — ONDANSETRON 8 MG/1
8 TABLET, ORALLY DISINTEGRATING ORAL EVERY 8 HOURS PRN
Status: DISCONTINUED | OUTPATIENT
Start: 2020-04-04 | End: 2020-04-04

## 2020-04-04 RX ORDER — DIPHENHYDRAMINE HYDROCHLORIDE 50 MG/ML
25 INJECTION INTRAMUSCULAR; INTRAVENOUS EVERY 4 HOURS PRN
Status: DISCONTINUED | OUTPATIENT
Start: 2020-04-04 | End: 2020-04-06 | Stop reason: HOSPADM

## 2020-04-04 RX ORDER — CALCIUM CARBONATE 200(500)MG
500 TABLET,CHEWABLE ORAL 3 TIMES DAILY PRN
Status: DISCONTINUED | OUTPATIENT
Start: 2020-04-04 | End: 2020-04-04

## 2020-04-04 RX ORDER — OXYTOCIN/RINGER'S LACTATE 30/500 ML
41.7 PLASTIC BAG, INJECTION (ML) INTRAVENOUS CONTINUOUS
Status: DISCONTINUED | OUTPATIENT
Start: 2020-04-04 | End: 2020-04-04

## 2020-04-04 RX ORDER — BUTORPHANOL TARTRATE 2 MG/ML
1 INJECTION INTRAMUSCULAR; INTRAVENOUS
Status: DISCONTINUED | OUTPATIENT
Start: 2020-04-04 | End: 2020-04-04

## 2020-04-04 RX ORDER — ACETAMINOPHEN 325 MG/1
650 TABLET ORAL EVERY 6 HOURS PRN
Status: DISCONTINUED | OUTPATIENT
Start: 2020-04-04 | End: 2020-04-06 | Stop reason: HOSPADM

## 2020-04-04 RX ORDER — LIDOCAINE HYDROCHLORIDE AND EPINEPHRINE 15; 5 MG/ML; UG/ML
INJECTION, SOLUTION EPIDURAL
Status: DISCONTINUED | OUTPATIENT
Start: 2020-04-04 | End: 2020-04-04

## 2020-04-04 RX ORDER — DOCUSATE SODIUM 100 MG/1
200 CAPSULE, LIQUID FILLED ORAL 2 TIMES DAILY PRN
Status: DISCONTINUED | OUTPATIENT
Start: 2020-04-04 | End: 2020-04-06 | Stop reason: HOSPADM

## 2020-04-04 RX ORDER — OXYTOCIN/RINGER'S LACTATE 30/500 ML
2 PLASTIC BAG, INJECTION (ML) INTRAVENOUS CONTINUOUS
Status: DISCONTINUED | OUTPATIENT
Start: 2020-04-04 | End: 2020-04-04

## 2020-04-04 RX ORDER — FENTANYL CITRATE 50 UG/ML
INJECTION, SOLUTION INTRAMUSCULAR; INTRAVENOUS
Status: DISCONTINUED | OUTPATIENT
Start: 2020-04-04 | End: 2020-04-04

## 2020-04-04 RX ORDER — BUTORPHANOL TARTRATE 2 MG/ML
2 INJECTION INTRAMUSCULAR; INTRAVENOUS
Status: DISCONTINUED | OUTPATIENT
Start: 2020-04-04 | End: 2020-04-04

## 2020-04-04 RX ORDER — SIMETHICONE 80 MG
1 TABLET,CHEWABLE ORAL 4 TIMES DAILY PRN
Status: DISCONTINUED | OUTPATIENT
Start: 2020-04-04 | End: 2020-04-04

## 2020-04-04 RX ORDER — DIPHENHYDRAMINE HCL 25 MG
25 CAPSULE ORAL EVERY 4 HOURS PRN
Status: DISCONTINUED | OUTPATIENT
Start: 2020-04-04 | End: 2020-04-06 | Stop reason: HOSPADM

## 2020-04-04 RX ORDER — ROPIVACAINE HYDROCHLORIDE 2 MG/ML
INJECTION, SOLUTION EPIDURAL; INFILTRATION; PERINEURAL
Status: DISCONTINUED | OUTPATIENT
Start: 2020-04-04 | End: 2020-04-04

## 2020-04-04 RX ORDER — PRENATAL WITH FERROUS FUM AND FOLIC ACID 3080; 920; 120; 400; 22; 1.84; 3; 20; 10; 1; 12; 200; 27; 25; 2 [IU]/1; [IU]/1; MG/1; [IU]/1; MG/1; MG/1; MG/1; MG/1; MG/1; MG/1; UG/1; MG/1; MG/1; MG/1; MG/1
1 TABLET ORAL DAILY
Status: DISCONTINUED | OUTPATIENT
Start: 2020-04-04 | End: 2020-04-06 | Stop reason: HOSPADM

## 2020-04-04 RX ORDER — ROPIVACAINE HYDROCHLORIDE 2 MG/ML
INJECTION, SOLUTION EPIDURAL; INFILTRATION; PERINEURAL CONTINUOUS PRN
Status: DISCONTINUED | OUTPATIENT
Start: 2020-04-04 | End: 2020-04-04

## 2020-04-04 RX ADMIN — DEXTROSE 5 MILLION UNITS: 50 INJECTION, SOLUTION INTRAVENOUS at 05:04

## 2020-04-04 RX ADMIN — HYDROCODONE BITARTRATE AND ACETAMINOPHEN 1 TABLET: 5; 325 TABLET ORAL at 10:04

## 2020-04-04 RX ADMIN — LIDOCAINE HYDROCHLORIDE,EPINEPHRINE BITARTRATE 3 ML: 15; .005 INJECTION, SOLUTION EPIDURAL; INFILTRATION; INTRACAUDAL; PERINEURAL at 05:04

## 2020-04-04 RX ADMIN — SODIUM CHLORIDE, SODIUM LACTATE, POTASSIUM CHLORIDE, AND CALCIUM CHLORIDE 1000 ML: .6; .31; .03; .02 INJECTION, SOLUTION INTRAVENOUS at 05:04

## 2020-04-04 RX ADMIN — IBUPROFEN 600 MG: 600 TABLET, FILM COATED ORAL at 06:04

## 2020-04-04 RX ADMIN — ROPIVACAINE HYDROCHLORIDE 14 ML/HR: 2 INJECTION, SOLUTION EPIDURAL; INFILTRATION at 05:04

## 2020-04-04 RX ADMIN — FENTANYL CITRATE 100 MCG: 50 INJECTION, SOLUTION INTRAMUSCULAR; INTRAVENOUS at 05:04

## 2020-04-04 RX ADMIN — ROPIVACAINE HYDROCHLORIDE 3 ML: 2 INJECTION, SOLUTION EPIDURAL; INFILTRATION at 05:04

## 2020-04-04 RX ADMIN — PRENATAL VIT W/ FE FUMARATE-FA TAB 27-0.8 MG 1 TABLET: 27-0.8 TAB at 06:04

## 2020-04-04 RX ADMIN — DEXTROSE 3 MILLION UNITS: 50 INJECTION, SOLUTION INTRAVENOUS at 08:04

## 2020-04-04 RX ADMIN — SODIUM CHLORIDE, SODIUM LACTATE, POTASSIUM CHLORIDE, AND CALCIUM CHLORIDE: .6; .31; .03; .02 INJECTION, SOLUTION INTRAVENOUS at 05:04

## 2020-04-04 RX ADMIN — ROPIVACAINE HYDROCHLORIDE 5 ML: 2 INJECTION, SOLUTION EPIDURAL; INFILTRATION at 05:04

## 2020-04-04 NOTE — PLAN OF CARE
SVE 3/90/-2. Epidural for pain management. VSS. Afebrile. Call light in reach. Bed in lowest position.

## 2020-04-04 NOTE — NURSING

## 2020-04-04 NOTE — PROGRESS NOTES
S: resting comfortable with ALANA in place  O:  VS reviewed, afebrile   Vitals:    04/04/20 0800 04/04/20 0900 04/04/20 1000 04/04/20 1100   BP: 105/71 118/75 108/69 100/64   Pulse: 71 67 69 93   Resp: 18  18 18   Temp:   98 °F (36.7 °C)    TempSrc:   Axillary    SpO2: 100% 100% 100% 100%       FHTs 130/ cat 1  UC  2-3  SVE 7/90/+1 per RN     A: IUP @ 40w4d ;     Patient Active Problem List   Diagnosis    Generalized anxiety disorder with panic attacks    Supervision of normal pregnancy    Seizure-like activity    Pyelonephritis    Other chest pain    Sepsis    GBS (group B Streptococcus carrier), +RV culture, currently pregnant    Urinary tract infection without hematuria    Post-term pregnancy, 40-42 weeks of gestation    Post-dates pregnancy    Normal labor       P:   Continue close monitoring of maternal/fetal status

## 2020-04-04 NOTE — L&D DELIVERY NOTE
[facility]  Vaginal Delivery   Operative Note    SUMMARY      vaginal delivery of live infant after 30 second shoulder dystocia. Mya and superpubic pressure used; anterior (right) shoulder released with woodscrew maneuver. Infant  skin to skin was unable to be performed due to infant brought to warmer to be evaluated by NICU. .  Infant delivered position OA over intact perineum.  Nuchal cord: No.    Spontaneous delivery of placenta and no pitocin given noting good uterine tone.  bilateral labial lacerations noted and repaired in usual fashion with 3.0 chromic on SH. .  Patient tolerated delivery well. Sponge needle and lap counted correctly x2.    Indications: Normal labor  Pregnancy complicated by:   Patient Active Problem List   Diagnosis    Generalized anxiety disorder with panic attacks    Supervision of normal pregnancy    Seizure-like activity    Pyelonephritis    Other chest pain    Sepsis    GBS (group B Streptococcus carrier), +RV culture, currently pregnant    Urinary tract infection without hematuria    Post-term pregnancy, 40-42 weeks of gestation    Post-dates pregnancy    Normal labor    Vaginal delivery    Single liveborn, born in hospital    Obstetrical laceration    Shoulder dystocia, delivered     Admitting GA: 40w4d    This patient has no babies on file.

## 2020-04-04 NOTE — ANESTHESIA PREPROCEDURE EVALUATION
04/04/2020  Karyn Wells is a 19 y.o., female.    Anesthesia Evaluation    I have reviewed the Patient Summary Reports.    I have reviewed the Nursing Notes.   I have reviewed the Medications.     Review of Systems  Anesthesia Hx:  Neg history of prior surgery. Denies Family Hx of Anesthesia complications.   Denies Personal Hx of Anesthesia complications.   OB/GYN/PEDS:  Planned Vaginal Delivery    Neurological:   Seizures        Physical Exam  General:  Well nourished    Airway/Jaw/Neck:  Airway Findings: Mouth Opening: Normal Tongue: Normal  General Airway Assessment: Adult  Mallampati: II  Jaw/Neck Findings:  Neck ROM: Normal ROM     Eyes/Ears/Nose:  EYES/EARS/NOSE FINDINGS: Normal   Dental:  Dental Findings: In tact   Chest/Lungs:  Chest/Lungs Clear    Heart/Vascular:  Heart Findings: Normal Heart murmur: negative       Mental Status:  Mental Status Findings:  Cooperative         Anesthesia Plan  Type of Anesthesia, risks & benefits discussed:  Anesthesia Type:  epidural  Patient's Preference:   Intra-op Monitoring Plan: standard ASA monitors  Intra-op Monitoring Plan Comments:   Post Op Pain Control Plan: epidural analgesia  Post Op Pain Control Plan Comments:   Induction:    Beta Blocker:         Informed Consent: Patient understands risks and agrees with Anesthesia plan.  Questions answered. Anesthesia consent signed with patient.  ASA Score: 2     Day of Surgery Review of History & Physical:    H&P update referred to the surgeon.         Ready For Surgery From Anesthesia Perspective.

## 2020-04-04 NOTE — H&P
[facility] -   Obstetrics  History & Physical    Patient Name: Karyn Wells  MRN: [id number]  Admission Date: 2020  Primary Care Provider: Primary Doctor No    Subjective:     Principal Problem:Normal labor    History of Present Illness:   40.4 to L&D with c/o regular ctx      Obstetric HPI:  Patient reports regular, strong contractions, active fetal movement, No vaginal bleeding , No loss of fluid     This pregnancy has been complicated by GBS+, anxiety, seizure-like activity, pyelonephritis with sepsis.    OB History    Para Term  AB Living   2 0 0 0 1 0   SAB TAB Ectopic Multiple Live Births   1 0 0 0 0      # Outcome Date GA Lbr Fidel/2nd Weight Sex Delivery Anes PTL Lv   2 Current            1 SAB              Past Medical History:   Diagnosis Date    Acute conjunctivitis of left eye 2020    Decreased fetal movement 2020    Seizure     SVT (supraventricular tachycardia)      History reviewed. No pertinent surgical history.    PTA Medications   Medication Sig    hydrOXYzine pamoate (VISTARIL) 25 MG Cap Take 2 capsules (50 mg total) by mouth 3 (three) times daily as needed.    NEXPLANON 68 mg Impl subdermal device     nitrofurantoin, macrocrystal-monohydrate, (MACROBID) 100 MG capsule Take 1 capsule (100 mg total) by mouth 2 (two) times daily. for 10 days    prenatal vit-iron fum-folic ac (PRENATAL VITAMIN) 27 mg iron- 0.8 mg Tab Take 1 tablet by mouth once daily.       Review of patient's allergies indicates:  No Known Allergies     Family History     None        Tobacco Use    Smoking status: Never Smoker    Smokeless tobacco: Never Used   Substance and Sexual Activity    Alcohol use: No     Frequency: Never    Drug use: No    Sexual activity: Yes     Partners: Male     Review of Systems   Constitutional: Negative.    HENT: Negative.    Eyes: Negative.    Respiratory: Negative.    Cardiovascular: Negative.    Gastrointestinal: Positive for  abdominal pain.        Abdominal pain consistent with contractions   Genitourinary: Negative.    Neurological: Negative.    All other systems reviewed and are negative.     Objective:     Vital Signs (Most Recent):  Temp: 97.6 °F (36.4 °C) (04/04/20 0602)  Pulse: 63 (04/04/20 0632)  BP: 109/64 (04/04/20 0632)  SpO2: 99 % (04/04/20 0632) Vital Signs (24h Range):  Temp:  [97.6 °F (36.4 °C)] 97.6 °F (36.4 °C)  Pulse:  [60-86] 63  SpO2:  [99 %-100 %] 99 %  BP: ()/(61-78) 109/64        There is no height or weight on file to calculate BMI.    FHT: Cat 1 (reassuring) accels present, no decels  TOCO:  Q 2-6 minutes    Physical Exam:   Constitutional: She is oriented to person, place, and time. She appears well-developed and well-nourished.    HENT:   Head: Normocephalic and atraumatic.    Eyes: Conjunctivae and EOM are normal.    Neck: Normal range of motion.    Cardiovascular: Normal rate, regular rhythm and normal heart sounds.     Pulmonary/Chest: Effort normal and breath sounds normal. No respiratory distress.        Abdominal: Soft. Bowel sounds are normal. She exhibits no distension. There is no tenderness.     Genitourinary: Vagina normal and uterus normal.           Musculoskeletal: Normal range of motion and moves all extremeties.       Neurological: She is alert and oriented to person, place, and time.    Skin: Skin is warm and dry.    Psychiatric: She has a normal mood and affect. Her behavior is normal. Judgment and thought content normal.       Cervix:  Dilation:  3  Effacement:  90%  Station: -2  Presentation: Vertex BBOW    RN exam     Significant Labs:  Lab Results   Component Value Date    GROUPTRH AB POS 04/04/2020    HEPBSAG Negative 12/02/2019    STREPBCULT (A) 03/05/2020     STREPTOCOCCUS AGALACTIAE (GROUP B)  Beta-hemolytic streptococci are routinely susceptible to   penicillins,cephalosporins and carbapenems.         I have personallly reviewed all pertinent lab results from the last 24  hours.    Assessment/Plan:     19 y.o. female  at 40w4d for:    * Normal labor  Admit to L&D for management of labor  Epidural placed  Continue to monitor maternal/fetal status  Anticipate     GBS (group B Streptococcus carrier), +RV culture, currently pregnant  PCN per protocol        Miya Harkins CNM  Obstetrics  [facility]

## 2020-04-04 NOTE — ANESTHESIA POSTPROCEDURE EVALUATION
Anesthesia Post Evaluation    Patient: Karyn Wells    Procedure(s) Performed: * No procedures listed *    Final Anesthesia Type: epidural    Patient location during evaluation: labor & delivery  Patient participation: Yes- Able to Participate  Level of consciousness: awake and alert, awake and oriented  Post-procedure vital signs: reviewed and stable  Pain management: adequate  Airway patency: patent    PONV status at discharge: No PONV  Anesthetic complications: no      Cardiovascular status: blood pressure returned to baseline and hemodynamically stable  Respiratory status: unassisted and spontaneous ventilation  Hydration status: euvolemic  Follow-up not needed.          Vitals Value Taken Time   /77 4/4/2020  4:02 PM   Temp 37 °C (98.6 °F) 4/4/2020  1:30 PM   Pulse 116 4/4/2020  4:02 PM   Resp 18 4/4/2020  1:50 PM   SpO2 100 % 4/4/2020  4:02 PM   Vitals shown include unvalidated device data.      No case tracking events are documented in the log.      Pain/Carlos Score: No data recorded

## 2020-04-04 NOTE — ASSESSMENT & PLAN NOTE
Admit to L&D for management of labor  Epidural placed  Continue to monitor maternal/fetal status  Anticipate

## 2020-04-04 NOTE — PROGRESS NOTES
[facility]  Obstetrics  Labor Progress Note    Patient Name: Karyn Wells  MRN: [id number]  Admission Date: 2020  Hospital Length of Stay: 0 days  Attending Physician: Olivia Celaya, *  Primary Care Provider: Primary Doctor No    Subjective:     Principal Problem:Normal labor    Hospital Course:  Admit to L&D for management of normal labor  Epidural placed per patient request      Interval History:  Karyn is a 19 y.o.  at 40w4d. She is doing well. Sleeping with ALANA in place.     Objective:     Vital Signs (Most Recent):  Temp: 97.6 °F (36.4 °C) (20)  Pulse: 63 (20)  BP: 109/64 (20)  SpO2: 99 % (20) Vital Signs (24h Range):  Temp:  [97.6 °F (36.4 °C)] 97.6 °F (36.4 °C)  Pulse:  [60-86] 63  SpO2:  [99 %-100 %] 99 %  BP: ()/(61-78) 109/64        There is no height or weight on file to calculate BMI.    FHT: 125 Cat 1 (reassuring)  TOCO:  Q 2-4 minutes    Cervical Exam:  Deffered     Significant Labs:  Lab Results   Component Value Date    GROUPTRH AB POS 2020    HEPBSAG Negative 2019    STREPBCULT (A) 2020     STREPTOCOCCUS AGALACTIAE (GROUP B)  Beta-hemolytic streptococci are routinely susceptible to   penicillins,cephalosporins and carbapenems.         I have personallly reviewed all pertinent lab results from the last 24 hours.    Physical Exam:   Constitutional: She is oriented to person, place, and time. She appears well-developed and well-nourished.    HENT:   Head: Normocephalic.     Neck: Normal range of motion.    Cardiovascular: Normal rate and regular rhythm.     Pulmonary/Chest: Effort normal.        Abdominal: Soft.     Genitourinary: Vagina normal and uterus normal.           Musculoskeletal: Normal range of motion and moves all extremeties.       Neurological: She is alert and oriented to person, place, and time.    Skin: Skin is warm and dry.        Assessment/Plan:     19 y.o. female   at 40w4d for:    * Normal labor  Admit to L&D for management of labor  Epidural placed  Continue to monitor maternal/fetal status  Anticipate     GBS (group B Streptococcus carrier), +RV culture, currently pregnant  PCN per protocol          Edwige Thorpe CNM  Obstetrics  [facility]

## 2020-04-04 NOTE — PROGRESS NOTES
S: Resting well with epidural in place  O:  VS reviewed, afebrile   Vitals:    04/04/20 0617 04/04/20 0632 04/04/20 0700 04/04/20 0800   BP: 105/62 109/64 104/65 105/71   Pulse:  63 69 71   Resp:   18 18   Temp:       TempSrc:       SpO2:  99% 100% 100%       FHTs 130/cat 1, reassuring  UC 2-3  SVE 5/90/-2  AROM of clear fluid @ 0826    A: IUP @ 40w4d ;     Patient Active Problem List   Diagnosis    Generalized anxiety disorder with panic attacks    Supervision of normal pregnancy    Seizure-like activity    Pyelonephritis    Other chest pain    Sepsis    GBS (group B Streptococcus carrier), +RV culture, currently pregnant    Urinary tract infection without hematuria    Post-term pregnancy, 40-42 weeks of gestation    Post-dates pregnancy    Normal labor       P:   Continue close monitoring of maternal/fetal status

## 2020-04-04 NOTE — SUBJECTIVE & OBJECTIVE
Obstetric HPI:  Patient reports regular, strong contractions, active fetal movement, No vaginal bleeding , No loss of fluid     This pregnancy has been complicated by GBS+, anxiety, seizure-like activity, pyelonephritis with sepsis.    OB History    Para Term  AB Living   2 0 0 0 1 0   SAB TAB Ectopic Multiple Live Births   1 0 0 0 0      # Outcome Date GA Lbr Fidel/2nd Weight Sex Delivery Anes PTL Lv   2 Current            1 SAB              Past Medical History:   Diagnosis Date    Acute conjunctivitis of left eye 2020    Decreased fetal movement 2020    Seizure     SVT (supraventricular tachycardia)      History reviewed. No pertinent surgical history.    PTA Medications   Medication Sig    hydrOXYzine pamoate (VISTARIL) 25 MG Cap Take 2 capsules (50 mg total) by mouth 3 (three) times daily as needed.    NEXPLANON 68 mg Impl subdermal device     nitrofurantoin, macrocrystal-monohydrate, (MACROBID) 100 MG capsule Take 1 capsule (100 mg total) by mouth 2 (two) times daily. for 10 days    prenatal vit-iron fum-folic ac (PRENATAL VITAMIN) 27 mg iron- 0.8 mg Tab Take 1 tablet by mouth once daily.       Review of patient's allergies indicates:  No Known Allergies     Family History     None        Tobacco Use    Smoking status: Never Smoker    Smokeless tobacco: Never Used   Substance and Sexual Activity    Alcohol use: No     Frequency: Never    Drug use: No    Sexual activity: Yes     Partners: Male     Review of Systems   Constitutional: Negative.    HENT: Negative.    Eyes: Negative.    Respiratory: Negative.    Cardiovascular: Negative.    Gastrointestinal: Positive for abdominal pain.        Abdominal pain consistent with contractions   Genitourinary: Negative.    Neurological: Negative.    All other systems reviewed and are negative.     Objective:     Vital Signs (Most Recent):  Temp: 97.6 °F (36.4 °C) (20 0602)  Pulse: 63 (20 0632)  BP: 109/64 (20  0632)  SpO2: 99 % (04/04/20 0632) Vital Signs (24h Range):  Temp:  [97.6 °F (36.4 °C)] 97.6 °F (36.4 °C)  Pulse:  [60-86] 63  SpO2:  [99 %-100 %] 99 %  BP: ()/(61-78) 109/64        There is no height or weight on file to calculate BMI.    FHT: Cat 1 (reassuring) accels present, no decels  TOCO:  Q 2-6 minutes    Physical Exam:   Constitutional: She is oriented to person, place, and time. She appears well-developed and well-nourished.    HENT:   Head: Normocephalic and atraumatic.    Eyes: Conjunctivae and EOM are normal.    Neck: Normal range of motion.    Cardiovascular: Normal rate, regular rhythm and normal heart sounds.     Pulmonary/Chest: Effort normal and breath sounds normal. No respiratory distress.        Abdominal: Soft. Bowel sounds are normal. She exhibits no distension. There is no tenderness.     Genitourinary: Vagina normal and uterus normal.           Musculoskeletal: Normal range of motion and moves all extremeties.       Neurological: She is alert and oriented to person, place, and time.    Skin: Skin is warm and dry.    Psychiatric: She has a normal mood and affect. Her behavior is normal. Judgment and thought content normal.       Cervix:  Dilation:  3  Effacement:  90%  Station: -2  Presentation: Vertex BBOW    RN exam     Significant Labs:  Lab Results   Component Value Date    GROUPTRH AB POS 04/04/2020    HEPBSAG Negative 12/02/2019    STREPBCULT (A) 03/05/2020     STREPTOCOCCUS AGALACTIAE (GROUP B)  Beta-hemolytic streptococci are routinely susceptible to   penicillins,cephalosporins and carbapenems.         I have personallly reviewed all pertinent lab results from the last 24 hours.

## 2020-04-04 NOTE — HOSPITAL COURSE
Admit to L&D for management of normal labor  Epidural placed per patient request  4/5/2020--PPD1, Routine PP Care  4/6/20--PPD #2, normal PP course, d/c home today

## 2020-04-04 NOTE — SUBJECTIVE & OBJECTIVE
Interval History:  Karyn is a 19 y.o.  at 40w4d. She is doing well. Sleeping with ALANA in place.     Objective:     Vital Signs (Most Recent):  Temp: 97.6 °F (36.4 °C) (20 0602)  Pulse: 63 (20 0632)  BP: 109/64 (20 0632)  SpO2: 99 % (20 06) Vital Signs (24h Range):  Temp:  [97.6 °F (36.4 °C)] 97.6 °F (36.4 °C)  Pulse:  [60-86] 63  SpO2:  [99 %-100 %] 99 %  BP: ()/(61-78) 109/64        There is no height or weight on file to calculate BMI.    FHT: 125 Cat 1 (reassuring)  TOCO:  Q 2-4 minutes    Cervical Exam:  Deffered     Significant Labs:  Lab Results   Component Value Date    GROUPTRH AB POS 2020    HEPBSAG Negative 2019    STREPBCULT (A) 2020     STREPTOCOCCUS AGALACTIAE (GROUP B)  Beta-hemolytic streptococci are routinely susceptible to   penicillins,cephalosporins and carbapenems.         I have personallly reviewed all pertinent lab results from the last 24 hours.    Physical Exam:   Constitutional: She is oriented to person, place, and time. She appears well-developed and well-nourished.    HENT:   Head: Normocephalic.     Neck: Normal range of motion.    Cardiovascular: Normal rate and regular rhythm.     Pulmonary/Chest: Effort normal.        Abdominal: Soft.     Genitourinary: Vagina normal and uterus normal.           Musculoskeletal: Normal range of motion and moves all extremeties.       Neurological: She is alert and oriented to person, place, and time.    Skin: Skin is warm and dry.

## 2020-04-05 PROBLEM — O99.820 GBS (GROUP B STREPTOCOCCUS CARRIER), +RV CULTURE, CURRENTLY PREGNANT: Status: RESOLVED | Noted: 2020-03-10 | Resolved: 2020-04-05

## 2020-04-05 PROBLEM — O48.0 POST-DATES PREGNANCY: Status: RESOLVED | Noted: 2020-04-04 | Resolved: 2020-04-05

## 2020-04-05 PROBLEM — O48.0 POST-TERM PREGNANCY, 40-42 WEEKS OF GESTATION: Status: RESOLVED | Noted: 2020-04-01 | Resolved: 2020-04-05

## 2020-04-05 PROBLEM — R07.89 OTHER CHEST PAIN: Status: RESOLVED | Noted: 2020-02-19 | Resolved: 2020-04-05

## 2020-04-05 PROBLEM — Z34.90 SUPERVISION OF NORMAL PREGNANCY: Status: RESOLVED | Noted: 2020-01-09 | Resolved: 2020-04-05

## 2020-04-05 PROBLEM — Z37.9 NORMAL LABOR: Status: RESOLVED | Noted: 2020-04-04 | Resolved: 2020-04-05

## 2020-04-05 PROCEDURE — 99232 SBSQ HOSP IP/OBS MODERATE 35: CPT | Mod: ,,, | Performed by: ADVANCED PRACTICE MIDWIFE

## 2020-04-05 PROCEDURE — 25000003 PHARM REV CODE 250: Performed by: ADVANCED PRACTICE MIDWIFE

## 2020-04-05 PROCEDURE — 11000001 HC ACUTE MED/SURG PRIVATE ROOM

## 2020-04-05 PROCEDURE — 99232 PR SUBSEQUENT HOSPITAL CARE,LEVL II: ICD-10-PCS | Mod: ,,, | Performed by: ADVANCED PRACTICE MIDWIFE

## 2020-04-05 RX ADMIN — IBUPROFEN 600 MG: 600 TABLET, FILM COATED ORAL at 12:04

## 2020-04-05 RX ADMIN — IBUPROFEN 600 MG: 600 TABLET, FILM COATED ORAL at 06:04

## 2020-04-05 RX ADMIN — IBUPROFEN 600 MG: 600 TABLET, FILM COATED ORAL at 05:04

## 2020-04-05 RX ADMIN — PRENATAL VIT W/ FE FUMARATE-FA TAB 27-0.8 MG 1 TABLET: 27-0.8 TAB at 08:04

## 2020-04-05 RX ADMIN — IBUPROFEN 600 MG: 600 TABLET, FILM COATED ORAL at 11:04

## 2020-04-05 NOTE — PROGRESS NOTES
[facility] -   Obstetrics  Postpartum Progress Note    Patient Name: Karyn Wells  MRN: [id number]  Admission Date: 4/4/2020  Hospital Length of Stay: 1 days  Attending Physician: Olivia Celaya, *  Primary Care Provider: Primary Doctor No    Subjective:     Principal Problem:Vaginal delivery    Hospital course: Admit to L&D for management of normal labor  Epidural placed per patient request  4/5/2020--PPD1, Routine PP Care    Interval History:     She is doing well this morning. She is tolerating a regular diet without nausea or vomiting. She is voiding spontaneously. She is ambulating. Vaginal bleeding is mild. She denies fever or chills. Abdominal pain is mild and controlled with oral medications. She is breastfeeding. She desires circumcision for her male baby: not applicable.    Objective:     Vital Signs (Most Recent):  Temp: 98 °F (36.7 °C) (04/05/20 0741)  Pulse: 67 (04/05/20 0741)  Resp: 18 (04/05/20 0741)  BP: 107/70 (04/05/20 0741)  SpO2: 100 % (04/04/20 1550) Vital Signs (24h Range):  Temp:  [97.7 °F (36.5 °C)-98.8 °F (37.1 °C)] 98 °F (36.7 °C)  Pulse:  [] 67  Resp:  [16-18] 18  SpO2:  [99 %-100 %] 100 %  BP: ()/() 107/70        There is no height or weight on file to calculate BMI.      Intake/Output Summary (Last 24 hours) at 4/5/2020 1018  Last data filed at 4/5/2020 0600  Gross per 24 hour   Intake --   Output 1950 ml   Net -1950 ml       Significant Labs:  Lab Results   Component Value Date    GROUPTRH AB POS 04/04/2020    HEPBSAG Negative 12/02/2019    STREPBCULT (A) 03/05/2020     STREPTOCOCCUS AGALACTIAE (GROUP B)  Beta-hemolytic streptococci are routinely susceptible to   penicillins,cephalosporins and carbapenems.       Recent Labs   Lab 04/04/20  0500   HGB 9.9*   HCT 31.7*       I have personallly reviewed all pertinent lab results from the last 24 hours.    Physical Exam:   Constitutional: She is oriented to person, place, and time. She  appears well-developed and well-nourished.    HENT:   Head: Normocephalic.     Neck: Normal range of motion.    Cardiovascular: Normal rate.     Pulmonary/Chest: Effort normal.        Abdominal: Soft.   Non-Tender             Musculoskeletal: Normal range of motion.       Neurological: She is alert and oriented to person, place, and time.    Skin: Skin is warm and dry.    Psychiatric: She has a normal mood and affect. Her behavior is normal. Judgment and thought content normal.       Assessment/Plan:     19 y.o. female  for:    * Vaginal delivery  2020: PPD1, Routine PP Care    Postpartum anemia  Iron added 20, H&H 9.9/31.7, will need to continue upon discharge    Obstetrical laceration  Routine Pericare    Single liveborn, born in hospital  Viable Female Infant  Care per Peds        Disposition: As patient meets milestones, will plan to discharge tomorrow.    Fabricio Kat, SAUMYA  Obstetrics  [facility]

## 2020-04-05 NOTE — SUBJECTIVE & OBJECTIVE
Hospital course: Admit to L&D for management of normal labor  Epidural placed per patient request  4/5/2020--PPD1, Routine PP Care    Interval History:     She is doing well this morning. She is tolerating a regular diet without nausea or vomiting. She is voiding spontaneously. She is ambulating. Vaginal bleeding is mild. She denies fever or chills. Abdominal pain is mild and controlled with oral medications. She is breastfeeding. She desires circumcision for her male baby: not applicable.    Objective:     Vital Signs (Most Recent):  Temp: 98 °F (36.7 °C) (04/05/20 0741)  Pulse: 67 (04/05/20 0741)  Resp: 18 (04/05/20 0741)  BP: 107/70 (04/05/20 0741)  SpO2: 100 % (04/04/20 1550) Vital Signs (24h Range):  Temp:  [97.7 °F (36.5 °C)-98.8 °F (37.1 °C)] 98 °F (36.7 °C)  Pulse:  [] 67  Resp:  [16-18] 18  SpO2:  [99 %-100 %] 100 %  BP: ()/() 107/70        There is no height or weight on file to calculate BMI.      Intake/Output Summary (Last 24 hours) at 4/5/2020 1018  Last data filed at 4/5/2020 0600  Gross per 24 hour   Intake --   Output 1950 ml   Net -1950 ml       Significant Labs:  Lab Results   Component Value Date    GROUPTRH AB POS 04/04/2020    HEPBSAG Negative 12/02/2019    STREPBCULT (A) 03/05/2020     STREPTOCOCCUS AGALACTIAE (GROUP B)  Beta-hemolytic streptococci are routinely susceptible to   penicillins,cephalosporins and carbapenems.       Recent Labs   Lab 04/04/20  0500   HGB 9.9*   HCT 31.7*       I have personallly reviewed all pertinent lab results from the last 24 hours.    Physical Exam:   Constitutional: She is oriented to person, place, and time. She appears well-developed and well-nourished.    HENT:   Head: Normocephalic.     Neck: Normal range of motion.    Cardiovascular: Normal rate.     Pulmonary/Chest: Effort normal.        Abdominal: Soft.   Non-Tender             Musculoskeletal: Normal range of motion.       Neurological: She is alert and oriented to person, place, and  time.    Skin: Skin is warm and dry.    Psychiatric: She has a normal mood and affect. Her behavior is normal. Judgment and thought content normal.

## 2020-04-05 NOTE — PLAN OF CARE
Patient afebrile and had no falls this shift. Fundus firm without massage and below umbilicus. Bleeding scant to light, no clots passed this shift. Voids spontaneously. Ambulates independently. Pain well controlled with oral pain medication. Vital signs stable at this time. Bonding well with infant; responds to infant cues and participates in infant care. Will continue to monitor.

## 2020-04-06 VITALS
HEART RATE: 56 BPM | RESPIRATION RATE: 18 BRPM | DIASTOLIC BLOOD PRESSURE: 78 MMHG | TEMPERATURE: 98 F | OXYGEN SATURATION: 100 % | SYSTOLIC BLOOD PRESSURE: 119 MMHG

## 2020-04-06 PROCEDURE — 99238 HOSP IP/OBS DSCHRG MGMT 30/<: CPT | Mod: ,,, | Performed by: MIDWIFE

## 2020-04-06 PROCEDURE — 99238 PR HOSPITAL DISCHARGE DAY,<30 MIN: ICD-10-PCS | Mod: ,,, | Performed by: MIDWIFE

## 2020-04-06 PROCEDURE — 25000003 PHARM REV CODE 250: Performed by: ADVANCED PRACTICE MIDWIFE

## 2020-04-06 RX ORDER — IBUPROFEN 600 MG/1
600 TABLET ORAL EVERY 6 HOURS PRN
Qty: 60 TABLET | Refills: 1 | Status: SHIPPED | OUTPATIENT
Start: 2020-04-06 | End: 2020-05-13

## 2020-04-06 RX ORDER — FERROUS SULFATE 325(65) MG
325 TABLET ORAL DAILY
Qty: 30 TABLET | Refills: 3 | Status: SHIPPED | OUTPATIENT
Start: 2020-04-06 | End: 2020-05-13

## 2020-04-06 RX ADMIN — IBUPROFEN 600 MG: 600 TABLET, FILM COATED ORAL at 12:04

## 2020-04-06 RX ADMIN — IBUPROFEN 600 MG: 600 TABLET, FILM COATED ORAL at 05:04

## 2020-04-06 NOTE — DISCHARGE INSTRUCTIONS
"Mother Self Care:    Activity: Avoid strenuous exercise and get adequate rest.  No driving until the physician consent given.  Emotional Changes: Most women find birth to be a time of great emotional upheaval.  Sense of loss, mood swings, fatigue, anxiety, and feeling "let down" are common.  If feelings worsen or last more than a week, call your physician.  Breast Care/Breastfeeding: Wear a bra for comfort.  Keep nipples dry and apply your own breast milk or lanolin cream as needed for soreness.  Engorgement can be relieved with warm, moist heat before feedings.  You may also take Ibuprofen.  Lashell-Care/Vaginal Bleeding: Remember to use your lashell-bottle after urinating.  Your flow will change from red, to pink, to yellow/white color over a period of 2 weeks.  Menstruation will return in 3-8 weeks, or longer if breastfeeding.  Episiotomy Vaginal Delivery: Stitches will dissolve within 10 days to 3 weeks.  Warm baths, tucks, and dermoplast will promote healing.  Avoid bubble baths or strong soaps.  Sexual Activity/Pelvic Rest: No sexual activity, tampons, or douching until your physician gives you consent.  Diet: Continue to eat from the five basic food groups, including plenty of protein, fruits, vegetables, and whole grains.  Limit empty calories and high fat foods.  Drink enough fluids to satisfy thirst and add an extra 500 calories for breastfeeding.  Constipation/Hemorrhoids: Drink plenty of water.  You may take a stool softener or natural laxative (Metamucil). You may use tucks or hemorrhoid ointment and soak in a warm tub.    CALL YOUR OB DOCTOR IF ANY OF THE FOLLOWING OCCURS:  *Heavy bleeding - saturating a pad an hour or passing any large (2-3 inches in size) blood clots.  *Any pain, redness, or tenderness in lower leg.  *You cannot care for yourself or your baby.  *Any signs of infection-      - Temperature greater than 100.5 degrees F      - Foul smelling vaginal discharge and/or incisional drainage      - " Increased episiotomy or incisional pain      - Hot, hard, red or sore area on breast      - Flu-like symptoms      - Any urgency, frequency or burning with urination    Return To the Hospital for further Evaluation:  · Headache not relieved by tylenol or ibuprofen  · Blurry vision, double vision, seeing spots, or flashing lights  · Feeling faint or passing out  · Right epigastric pain  · Difficulty breathing  · Swelling in hands, face, or feet  · Any of these symptoms accompanied by nausea/vomiting  · Gaining more than 5 pounds in one week  · Seizures  These symptoms could be an indication of elevated blood pressure.       If you have any questions that need to be answered immediately please call the Labor & Delivery Unit at [phone] and ask to speak to a nurse.

## 2020-04-06 NOTE — DISCHARGE SUMMARY
[facility] -   Obstetrics  Discharge Summary      Patient Name: Karyn Wells  MRN: [id number]  Admission Date: 2020  Hospital Length of Stay: 2 days  Discharge Date and Time: 2020  Attending Physician: Olivia Celaya, *   Discharging Provider: Robbi Abreu CNM   Primary Care Provider: Primary Doctor No    HPI:  40.4 to L&D with c/o regular ctx          * No surgery found *     Hospital Course:   Admit to L&D for management of normal labor  Epidural placed per patient request  2020--PPD1, Routine PP Care  20--PPD #2, normal PP course, d/c home today         Final Active Diagnoses:    Diagnosis Date Noted POA    PRINCIPAL PROBLEM:  Vaginal delivery [O80] 2020 Not Applicable    Postpartum anemia [O90.81] 2020 No    Single liveborn, born in hospital [Z38.00] 2020 No    Obstetrical laceration [O71.9] 2020 No    Shoulder dystocia, delivered [O66.0] 2020 No    Generalized anxiety disorder with panic attacks [F41.1, F41.0] 2020 Yes      Problems Resolved During this Admission:    Diagnosis Date Noted Date Resolved POA    Post-dates pregnancy [O48.0] 2020 Yes    Encounter for induction of labor [Z34.90] 2020 Not Applicable    Normal labor [O80, Z37.9] 2020 Not Applicable    GBS (group B Streptococcus carrier), +RV culture, currently pregnant [O99.820] 03/10/2020 2020 Not Applicable        Labs: All labs within the past 24 hours have been reviewed    Feeding Method: breast    Immunizations     None          Delivery:    Episiotomy: None   Lacerations:     Repair suture:     Repair # of packets:     Blood loss (ml):       Birth information:  YOB: 2020   Time of birth: 12:58 PM   Sex: female   Delivery type: Vaginal, Spontaneous   Gestational Age: 40w4d    Delivery Clinician:      Other providers:       Additional  information:  Forceps:    Vacuum:     Breech:    Observed anomalies      Living?:           APGARS  One minute Five minutes Ten minutes   Skin color:         Heart rate:         Grimace:         Muscle tone:         Breathing:         Totals: 7  9        Placenta: Delivered:       appearance    Pending Diagnostic Studies:     None          Discharged Condition: stable    Disposition: Home or Self Care    Follow Up:  Follow-up Information     Shalonda Duran CNM In 4 weeks.    Specialty:  Obstetrics and Gynecology  Why:  PP follow up  Contact information:  [address] [address] [phone]                 Patient Instructions:      Diet Adult Regular     Pelvic Rest   Order Comments: Nothing in the vagina, no tampons, douching or intercourse     Notify your health care provider if you experience any of the following:  temperature >100.4     Notify your health care provider if you experience any of the following:  severe uncontrolled pain     Notify your health care provider if you experience any of the following:  redness, tenderness, or signs of infection (pain, swelling, redness, odor or green/yellow discharge around incision site)     Notify your health care provider if you experience any of the following:  severe persistent headache     Notify your health care provider if you experience any of the following:  persistent dizziness, light-headedness, or visual disturbances     Medications:  Current Discharge Medication List      START taking these medications    Details   ferrous sulfate (FEOSOL) 325 mg (65 mg iron) Tab tablet Take 1 tablet (325 mg total) by mouth once daily.  Qty: 30 tablet, Refills: 3      ibuprofen (ADVIL,MOTRIN) 600 MG tablet Take 1 tablet (600 mg total) by mouth every 6 (six) hours as needed for Pain.  Qty: 60 tablet, Refills: 1         CONTINUE these medications which have NOT CHANGED    Details   NEXPLANON 68 mg Impl subdermal device       nitrofurantoin, macrocrystal-monohydrate, (MACROBID) 100 MG capsule  Take 1 capsule (100 mg total) by mouth 2 (two) times daily. for 10 days  Qty: 20 capsule, Refills: 0      prenatal vit-iron fum-folic ac (PRENATAL VITAMIN) 27 mg iron- 0.8 mg Tab Take 1 tablet by mouth once daily.  Qty: 30 tablet, Refills: 11         STOP taking these medications       hydrOXYzine pamoate (VISTARIL) 25 MG Cap Comments:   Reason for Stopping:               Robbi Abreu CNM  Obstetrics  [facility]

## 2020-04-09 ENCOUNTER — PATIENT MESSAGE (OUTPATIENT)
Dept: OBSTETRICS AND GYNECOLOGY | Facility: CLINIC | Age: 20
End: 2020-04-09

## 2020-04-10 ENCOUNTER — PATIENT MESSAGE (OUTPATIENT)
Dept: OBSTETRICS AND GYNECOLOGY | Facility: CLINIC | Age: 20
End: 2020-04-10

## 2020-04-13 RX ORDER — SERTRALINE HYDROCHLORIDE 50 MG/1
50 TABLET, FILM COATED ORAL DAILY
Qty: 30 TABLET | Refills: 2 | Status: SHIPPED | OUTPATIENT
Start: 2020-04-13 | End: 2021-04-13

## 2020-04-14 ENCOUNTER — PATIENT MESSAGE (OUTPATIENT)
Dept: OBSTETRICS AND GYNECOLOGY | Facility: CLINIC | Age: 20
End: 2020-04-14

## 2020-04-16 ENCOUNTER — TELEPHONE (OUTPATIENT)
Dept: OBSTETRICS AND GYNECOLOGY | Facility: CLINIC | Age: 20
End: 2020-04-16

## 2020-04-24 ENCOUNTER — TELEPHONE (OUTPATIENT)
Dept: SURGERY | Facility: CLINIC | Age: 20
End: 2020-04-24

## 2020-04-24 NOTE — TELEPHONE ENCOUNTER
Returned call appt suleiman to 5/1/20 with Dr Vidal/[facility]-4th floor @ 1:15PM. Pt verbalized an understanding

## 2020-04-24 NOTE — TELEPHONE ENCOUNTER
----- Message from Monica Pedraza sent at 4/24/2020  1:41 PM CDT -----  Contact: pt  .Type:  Patient Returning Call    Who Called: pt  Who Left Message for Patient:   Does the patient know what this is regarding?: no   Would the patient rather a call back or a response via [facility]? #call back   Best Call Back Number: [phone]   Additional Information:

## 2020-04-27 ENCOUNTER — TELEPHONE (OUTPATIENT)
Dept: SURGERY | Facility: CLINIC | Age: 20
End: 2020-04-27

## 2020-04-27 NOTE — TELEPHONE ENCOUNTER
Called patient to reschedule 5/1/2020 appointment. Received message that phone number was not able to receive calls.

## 2020-04-28 ENCOUNTER — TELEPHONE (OUTPATIENT)
Dept: SURGERY | Facility: CLINIC | Age: 20
End: 2020-04-28

## 2020-04-28 NOTE — TELEPHONE ENCOUNTER
Pt returning call from Connie Cole MA from yesterday 4/27/2020 - Pt was being called due to COVID schedule changes. Dr Vidal's clinic day Friday 5/1/2020 was moved to Tuesday 4/28/2020 so pt's appt needed to be moved to Tuesday 4/28/2020. - Called the [facility] to make sure  Marcy was still at [facility] so pt could be scheduled to see him today - Was told  Marcy was in Sx, if pt was still in pain offer to have her seen in clinic tomorrow 4/29/2020 by Dr Vasquez. Otherwise pt can be seen on Dr Vidal's next clinic day. - Pt advised she was still in pain and did not want to do a Virtual Visit - Scheduled pt an appt to see Dr Vasquez for tomorrow 4/29/2020, and advised that due to the COVID virus Dr Vasquez may have his assistant call to convert the appt to a Telemed appt as the providers are not seeing pt's in clinic at this time. - Pt verbalized understanding.

## 2020-04-29 ENCOUNTER — OFFICE VISIT (OUTPATIENT)
Dept: SURGERY | Facility: CLINIC | Age: 20
End: 2020-04-29
Payer: MEDICAID

## 2020-04-29 VITALS
DIASTOLIC BLOOD PRESSURE: 75 MMHG | TEMPERATURE: 98 F | BODY MASS INDEX: 26.68 KG/M2 | HEIGHT: 63 IN | WEIGHT: 150.56 LBS | SYSTOLIC BLOOD PRESSURE: 121 MMHG | HEART RATE: 85 BPM

## 2020-04-29 DIAGNOSIS — N13.30 HYDRONEPHROSIS, UNSPECIFIED HYDRONEPHROSIS TYPE: Primary | ICD-10-CM

## 2020-04-29 DIAGNOSIS — K80.20 CHOLELITHIASIS AFFECTING PREGNANCY, ANTEPARTUM: ICD-10-CM

## 2020-04-29 DIAGNOSIS — O26.619 CHOLELITHIASIS AFFECTING PREGNANCY, ANTEPARTUM: ICD-10-CM

## 2020-04-29 PROCEDURE — 99214 OFFICE O/P EST MOD 30 MIN: CPT | Mod: PBBFAC | Performed by: SURGERY

## 2020-04-29 PROCEDURE — 99999 PR PBB SHADOW E&M-EST. PATIENT-LVL IV: ICD-10-PCS | Mod: PBBFAC,,, | Performed by: SURGERY

## 2020-04-29 PROCEDURE — 99204 PR OFFICE/OUTPT VISIT, NEW, LEVL IV, 45-59 MIN: ICD-10-PCS | Mod: S$PBB,,, | Performed by: SURGERY

## 2020-04-29 PROCEDURE — 99204 OFFICE O/P NEW MOD 45 MIN: CPT | Mod: S$PBB,,, | Performed by: SURGERY

## 2020-04-29 PROCEDURE — 99999 PR PBB SHADOW E&M-EST. PATIENT-LVL IV: CPT | Mod: PBBFAC,,, | Performed by: SURGERY

## 2020-04-29 NOTE — LETTER
May 5, 2020      Cassie Garnica CNM  [address]  [place] [address]           [facility]  [address] [facility] [facility] [address]  Phone: [phone]  Fax: [phone]          Patient: Karyn Wells   MR Number: [id number]   YOB: 2000   Date of Visit: 4/29/2020       Dear Cassie Garnica:    Thank you for referring Karyn Wells to me for evaluation. Attached you will find relevant portions of my assessment and plan of care.    If you have questions, please do not hesitate to call me. I look forward to following Karyn Wells along with you.    Sincerely,    Nick Vasquez MD    Enclosure  CC:  No Recipients    If you would like to receive this communication electronically, please contact [email or web address] or [phone] to request more information on [employer] access.    For providers and/or their staff who would like to refer a patient to [facility], please contact us through our one-stop-shop provider referral line, [facility], at [phone].    If you feel you have received this communication in error or would no longer like to receive these types of communications, please e-mail [email or web address]

## 2020-05-05 ENCOUNTER — PATIENT MESSAGE (OUTPATIENT)
Dept: OBSTETRICS AND GYNECOLOGY | Facility: CLINIC | Age: 20
End: 2020-05-05

## 2020-05-06 NOTE — PROGRESS NOTES
History & Physical    SUBJECTIVE:     History of Present Illness:  Patient is a 19 y.o. female referred for gallstones. She reports multiple episodes of right flank/back discomfort. Denies any aggravating/relieving factors or post prandial association. She reports being treated for multiple UTIs over the last few months. She was noted to have gallstones on U/S on workup of discomfort as well as right sided hydronephrosis.    Chief Complaint   Patient presents with    Abdominal Pain       Review of patient's allergies indicates:  No Known Allergies    Current Outpatient Medications   Medication Sig Dispense Refill    ferrous sulfate (FEOSOL) 325 mg (65 mg iron) Tab tablet Take 1 tablet (325 mg total) by mouth once daily. 30 tablet 3    prenatal vit-iron fum-folic ac (PRENATAL VITAMIN) 27 mg iron- 0.8 mg Tab Take 1 tablet by mouth once daily. 30 tablet 11    sertraline (ZOLOFT) 50 MG tablet Take 1 tablet (50 mg total) by mouth once daily. 30 tablet 2    ibuprofen (ADVIL,MOTRIN) 600 MG tablet Take 1 tablet (600 mg total) by mouth every 6 (six) hours as needed for Pain. (Patient not taking: Reported on 4/29/2020) 60 tablet 1    NEXPLANON 68 mg Impl subdermal device        No current facility-administered medications for this visit.        Past Medical History:   Diagnosis Date    Acute conjunctivitis of left eye 1/1/2020    Decreased fetal movement 2/18/2020    Seizure     SVT (supraventricular tachycardia)      History reviewed. No pertinent surgical history.  Family History   Problem Relation Age of Onset    Breast cancer Neg Hx     Colon cancer Neg Hx     Ovarian cancer Neg Hx      Social History     Tobacco Use    Smoking status: Never Smoker    Smokeless tobacco: Never Used   Substance Use Topics    Alcohol use: No     Frequency: Never    Drug use: No        Review of Systems:  Review of Systems   Constitutional: Negative for activity change, appetite change, chills, diaphoresis, fatigue, fever and  "unexpected weight change.   HENT: Negative for congestion, dental problem, rhinorrhea and sore throat.    Eyes: Negative for visual disturbance.   Respiratory: Negative for cough, chest tightness, shortness of breath and wheezing.    Cardiovascular: Negative for chest pain, palpitations and leg swelling.   Gastrointestinal: Negative for abdominal distention, abdominal pain, constipation, diarrhea, nausea and vomiting.   Endocrine: Negative for cold intolerance, heat intolerance, polydipsia, polyphagia and polyuria.   Genitourinary: Positive for flank pain. Negative for difficulty urinating, dysuria, frequency, hematuria and urgency.   Musculoskeletal: Negative for arthralgias, gait problem, myalgias and neck pain.   Skin: Negative for color change, pallor, rash and wound.   Neurological: Negative for dizziness, syncope, weakness, light-headedness, numbness and headaches.   Hematological: Negative for adenopathy. Does not bruise/bleed easily.   Psychiatric/Behavioral: Negative for confusion, decreased concentration and sleep disturbance. The patient is not nervous/anxious.        OBJECTIVE:     Vital Signs (Most Recent)  Temp: 98 °F (36.7 °C) (04/29/20 1107)  Pulse: 85 (04/29/20 1107)  BP: 121/75 (04/29/20 1107)  5' 3" (1.6 m)  68.3 kg (150 lb 9.2 oz)     Physical Exam:  Physical Exam   Constitutional: She is oriented to person, place, and time. She appears well-developed and well-nourished. No distress.   HENT:   Head: Normocephalic and atraumatic.   Right Ear: External ear normal.   Left Ear: External ear normal.   Eyes: Pupils are equal, round, and reactive to light. Conjunctivae and EOM are normal. No scleral icterus.   Neck: Normal range of motion. Neck supple. No tracheal deviation present. No thyromegaly present.   Cardiovascular: Normal rate, regular rhythm, normal heart sounds and intact distal pulses. Exam reveals no gallop and no friction rub.   No murmur heard.  Pulmonary/Chest: Effort normal and breath " sounds normal. No respiratory distress. She has no wheezes. She has no rales. She exhibits no tenderness.   Abdominal: Soft. Bowel sounds are normal. She exhibits no distension. There is no tenderness. No hernia.   Musculoskeletal: Normal range of motion. She exhibits no edema, tenderness or deformity.   Lymphadenopathy:     She has no cervical adenopathy.   Neurological: She is alert and oriented to person, place, and time.   Skin: Skin is warm and dry. No rash noted. She is not diaphoretic. No erythema. No pallor.   Psychiatric: She has a normal mood and affect. Her behavior is normal. Judgment and thought content normal.   Vitals reviewed.      Diagnostic Results:  Retroperitoneal U/S:  Cholelithiasis.  Moderate right-sided hydronephrosis.  Nondistended bladder.  Small bilateral pleural effusions.  Small amount of perinephric fluid in the inferior pole region on the right.    ASSESSMENT/PLAN:     18 y/o female with flank pain    PLAN:Plan     Unclear if flank pain related to hydronephrosis/frequent UTI pyelonephritis during recent pregnancy or gallstones  Discussed with patient that she does not have classic symptoms of biliary colic. Recommend further eval of previously noted hydronephrosis/frequent UTIs prior to moving forward with cholecystectomy. Will place urology consult for further eval. If ruled out as a source will discuss moving forward with cholecystectomy

## 2020-05-13 ENCOUNTER — POSTPARTUM VISIT (OUTPATIENT)
Dept: OBSTETRICS AND GYNECOLOGY | Facility: CLINIC | Age: 20
End: 2020-05-13
Payer: MEDICAID

## 2020-05-13 VITALS
BODY MASS INDEX: 27.46 KG/M2 | DIASTOLIC BLOOD PRESSURE: 78 MMHG | HEIGHT: 63 IN | WEIGHT: 155 LBS | SYSTOLIC BLOOD PRESSURE: 112 MMHG

## 2020-05-13 DIAGNOSIS — Z30.017 NEXPLANON INSERTION: ICD-10-CM

## 2020-05-13 DIAGNOSIS — Z01.812 PRE-PROCEDURE LAB EXAM: Primary | ICD-10-CM

## 2020-05-13 DIAGNOSIS — F41.9 ANXIETY: ICD-10-CM

## 2020-05-13 PROBLEM — N39.0 URINARY TRACT INFECTION WITHOUT HEMATURIA: Status: RESOLVED | Noted: 2020-03-19 | Resolved: 2020-05-13

## 2020-05-13 PROBLEM — F41.1 GENERALIZED ANXIETY DISORDER WITH PANIC ATTACKS: Status: RESOLVED | Noted: 2020-01-01 | Resolved: 2020-05-13

## 2020-05-13 PROBLEM — A41.9 SEPSIS: Status: RESOLVED | Noted: 2020-02-19 | Resolved: 2020-05-13

## 2020-05-13 PROBLEM — F41.0 GENERALIZED ANXIETY DISORDER WITH PANIC ATTACKS: Status: RESOLVED | Noted: 2020-01-01 | Resolved: 2020-05-13

## 2020-05-13 PROBLEM — N12 PYELONEPHRITIS: Status: RESOLVED | Noted: 2020-02-18 | Resolved: 2020-05-13

## 2020-05-13 PROBLEM — R56.9 SEIZURE-LIKE ACTIVITY: Status: RESOLVED | Noted: 2020-01-09 | Resolved: 2020-05-13

## 2020-05-13 LAB
B-HCG UR QL: NEGATIVE
CTP QC/QA: YES

## 2020-05-13 PROCEDURE — 99999 PR PBB SHADOW E&M-EST. PATIENT-LVL III: ICD-10-PCS | Mod: PBBFAC,,, | Performed by: OBSTETRICS & GYNECOLOGY

## 2020-05-13 PROCEDURE — 81025 URINE PREGNANCY TEST: CPT | Mod: PBBFAC,PO | Performed by: OBSTETRICS & GYNECOLOGY

## 2020-05-13 PROCEDURE — 11981 INSERTION DRUG DLVR IMPLANT: CPT | Mod: PBBFAC,PO | Performed by: OBSTETRICS & GYNECOLOGY

## 2020-05-13 PROCEDURE — 99213 OFFICE O/P EST LOW 20 MIN: CPT | Mod: PBBFAC,PO | Performed by: OBSTETRICS & GYNECOLOGY

## 2020-05-13 PROCEDURE — 59430 PR CARE AFTER DELIVERY ONLY: ICD-10-PCS | Mod: ,,, | Performed by: OBSTETRICS & GYNECOLOGY

## 2020-05-13 PROCEDURE — 11981 INSERTION OF NEXPLANON: ICD-10-PCS | Mod: S$PBB,,, | Performed by: OBSTETRICS & GYNECOLOGY

## 2020-05-13 PROCEDURE — 99999 PR PBB SHADOW E&M-EST. PATIENT-LVL III: CPT | Mod: PBBFAC,,, | Performed by: OBSTETRICS & GYNECOLOGY

## 2020-05-13 PROCEDURE — 11981 INSERTION DRUG DLVR IMPLANT: CPT | Mod: S$PBB,,, | Performed by: OBSTETRICS & GYNECOLOGY

## 2020-05-13 RX ORDER — HYDROXYZINE PAMOATE 25 MG/1
25 CAPSULE ORAL 3 TIMES DAILY PRN
Qty: 30 CAPSULE | Refills: 1 | Status: SHIPPED | OUTPATIENT
Start: 2020-05-13

## 2020-05-13 NOTE — PROGRESS NOTES
"Subjective:       Karyn Wells is a 19 y.o. female who presents for a postpartum visit. She is 5 weeks postpartum following a spontaneous vaginal delivery. I have fully reviewed the prenatal and intrapartum course. The delivery was at term gestational weeks. Outcome: spontaneous vaginal delivery. Anesthesia: epidural. Postpartum course has been uncomplicated. Did experience some mild postpartum blues/depression and was placed on Zoloft 50 mg with good results.. Baby's course has been uncomplicated. Baby is feeding by bottle - Similac with Iron. Bleeding no bleeding. Bowel function is normal. Bladder function is normal. Patient is sexually active. Contraception method is condoms. Postpartum depression screening: negative.    Denies feeling any depression at this time. States has an occasional day where still feels anxious at times, but just a "couple of days a month".   Baby is thriving and doing well.     Has Nexplanon in office and wishes to proceed with placement following pp exam.     The following portions of the patient's history were reviewed and updated as appropriate: allergies, current medications, past family history, past medical history, past social history, past surgical history and problem list.    Review of Systems  Pertinent items are noted in HPI.     Objective:     UPT negative in office today.      /78   Ht 5' 3" (1.6 m)   Wt 70.3 kg (154 lb 15.7 oz)   Breastfeeding? No   BMI 27.45 kg/m²    General:  alert, appears stated age, cooperative and no distress    Breasts:  inspection negative, no nipple discharge or bleeding, no masses or nodularity palpable   Lungs: clear to auscultation bilaterally   Heart:  regular rate and rhythm, S1, S2 normal, no murmur, click, rub or gallop   Abdomen: soft, non-tender; bowel sounds normal; no masses,  no organomegaly    Vulva:  normal   Vagina: normal vagina, no discharge, exudate, lesion, or erythema   Cervix:  no cervical motion " tenderness   Corpus: normal size, contour, position, consistency, mobility, non-tender   Adnexa:  no mass, fullness, tenderness   Rectal Exam: Not performed.          Assessment:       Routine postpartum exam. Pap smear not done at today's visit.      Mild pp depression controlled on Zoloft 50 mg QD    Occasional anxiety.     Desires Nexplanon placement today.     Plan:      1. Contraception: condoms  2. Wishes to proceed with Nexplanon placement today.  3. Rx for Vistaril 25 mg TID prn anxiety sent to pharmacy on file.  4. To call office if feels depression returning or worsening for re-evaluation and medication adjustment.   5. To be released from pp recovery effective 5/20/2020.  6. Follow up in: 1 week  For post Nexplanon placement/evaluation or as needed.    7. Will continue thereafter with annual exams.

## 2020-05-14 ENCOUNTER — TELEPHONE (OUTPATIENT)
Dept: UROLOGY | Facility: CLINIC | Age: 20
End: 2020-05-14

## 2020-05-14 NOTE — TELEPHONE ENCOUNTER
Returned call to pt, explained that Dr. White is not accepting new medicaid pts at this time. She verbalized understanding.

## 2020-05-14 NOTE — PROCEDURES
Insertion of Nexplanon  Date/Time: 5/13/2020 8:30 AM  Performed by: Shalonda Duran CNM  Authorized by: Shalonda Duran CNM     Consent obtained:  Verbal and written  Consent given by:  Patient  Patient questions answered: yes    Patient agrees, verbalizes understanding, and wants to proceed: yes    Educational handouts given: yes    Instructions and paperwork completed: yes    Pre-procedure timeout performed: yes    Prepped with: povidone-iodine    Local anesthetic:  Lidocaine without epinephrine  The site was cleaned and prepped in a sterile fashion: yes    Small stab incision was made in arm: yes    Left/right:  Left  Preloaded Implanon trocar was placed subdermally: yes    Visualization of implant was obtained: yes    Nexplanon was inserted and trocar removed: yes    Visualization of notch in stilette and palpitation of device: yes    Palpitation confirms placement by provider and patient: yes    Site was closed with steri-strips and pressure bandage applied: yes     Nexplanon placed w/o incident.   Lot # [id number],  Exp [id number]

## 2020-05-14 NOTE — TELEPHONE ENCOUNTER
----- Message from Julisa Whitlock sent at 5/14/2020 10:50 AM CDT -----  Type:  Needs Medical Advice    Who Called:  Pt  Karyn  Symptoms (please be specific):  Pt states Dr Vasquez put in a referral for her to make an appt with a Urologist//she is having problems with her kidneys   How long has patient had these symptoms:     Pharmacy name and phone #:     Would the patient rather a call back or a response via [facility]?    Call back  Best Call Back Number:   [phone]  Additional Information:  Please call to schedule an appt for her//cass/nelson

## 2020-05-20 ENCOUNTER — OFFICE VISIT (OUTPATIENT)
Dept: OBSTETRICS AND GYNECOLOGY | Facility: CLINIC | Age: 20
End: 2020-05-20
Payer: MEDICAID

## 2020-05-20 VITALS
DIASTOLIC BLOOD PRESSURE: 68 MMHG | WEIGHT: 156.94 LBS | HEIGHT: 63 IN | SYSTOLIC BLOOD PRESSURE: 124 MMHG | BODY MASS INDEX: 27.81 KG/M2

## 2020-05-20 DIAGNOSIS — Z30.46 ENCOUNTER FOR SURVEILLANCE OF NEXPLANON SUBDERMAL CONTRACEPTIVE: Primary | ICD-10-CM

## 2020-05-20 PROCEDURE — 99213 OFFICE O/P EST LOW 20 MIN: CPT | Mod: PBBFAC,PO | Performed by: OBSTETRICS & GYNECOLOGY

## 2020-05-20 PROCEDURE — 99213 OFFICE O/P EST LOW 20 MIN: CPT | Mod: 24,,, | Performed by: OBSTETRICS & GYNECOLOGY

## 2020-05-20 PROCEDURE — 99999 PR PBB SHADOW E&M-EST. PATIENT-LVL III: ICD-10-PCS | Mod: PBBFAC,,, | Performed by: OBSTETRICS & GYNECOLOGY

## 2020-05-20 PROCEDURE — 99213 PR OFFICE/OUTPT VISIT, EST, LEVL III, 20-29 MIN: ICD-10-PCS | Mod: 24,,, | Performed by: OBSTETRICS & GYNECOLOGY

## 2020-05-20 PROCEDURE — 99999 PR PBB SHADOW E&M-EST. PATIENT-LVL III: CPT | Mod: PBBFAC,,, | Performed by: OBSTETRICS & GYNECOLOGY

## 2020-05-20 NOTE — PROGRESS NOTES
"Subjective:      Karyn Wells is a 19 y.o. female who presents for contraception counseling/ post Nexplanon placement. The patient has no complaints today. The patient is sexually active. Pertinent past medical history: none.    States not having any pain in arm, "healed without any problems".  Satisfied with device.     Menstrual History:  OB History        2    Para   1    Term   1            AB   1    Living   1       SAB   1    TAB        Ectopic        Multiple        Live Births   1                Menarche age: 13  No LMP recorded.       The following portions of the patient's history were reviewed and updated as appropriate: allergies, current medications, past family history, past medical history, past social history, past surgical history and problem list.    Review of Systems  Pertinent items are noted in HPI.     Objective:      /68   Ht 5' 3" (1.6 m)   Wt 71.2 kg (156 lb 15.5 oz)   Breastfeeding? No   BMI 27.81 kg/m²   General appearance: alert, appears stated age, cooperative and no distress  Extremities: Left inner aspect arm with easily palpable Nexplanon device. Heled well. No redness noted. No tenderness to site with palpation. Device in situ.      Assessment:      19 y.o., continuing Nexplanon, no contraindications.     Nexplanon in situ.     Plan:      Continue medications as prescribed for postpartum depression. If feels improving and wishes to discontinue to please notify office to document.     Annual exam in one year.   "

## 2020-05-25 ENCOUNTER — PATIENT MESSAGE (OUTPATIENT)
Dept: SURGERY | Facility: CLINIC | Age: 20
End: 2020-05-25

## 2020-05-27 ENCOUNTER — TELEPHONE (OUTPATIENT)
Dept: UROLOGY | Facility: CLINIC | Age: 20
End: 2020-05-27

## 2020-05-27 ENCOUNTER — PATIENT MESSAGE (OUTPATIENT)
Dept: OBSTETRICS AND GYNECOLOGY | Facility: CLINIC | Age: 20
End: 2020-05-27

## 2020-05-28 ENCOUNTER — TELEPHONE (OUTPATIENT)
Dept: OBSTETRICS AND GYNECOLOGY | Facility: CLINIC | Age: 20
End: 2020-05-28

## 2020-05-28 NOTE — TELEPHONE ENCOUNTER
Javi patient that is having some crying spells  and states she doesn't think the zoloft is working as well as it was .

## 2020-05-29 ENCOUNTER — PATIENT MESSAGE (OUTPATIENT)
Dept: REHABILITATION | Facility: HOSPITAL | Age: 20
End: 2020-05-29

## 2020-05-29 ENCOUNTER — TELEPHONE (OUTPATIENT)
Dept: REHABILITATION | Facility: HOSPITAL | Age: 20
End: 2020-05-29

## 2020-06-09 ENCOUNTER — OFFICE VISIT (OUTPATIENT)
Dept: NEUROLOGY | Facility: CLINIC | Age: 20
End: 2020-06-09
Payer: MEDICAID

## 2020-06-09 VITALS
DIASTOLIC BLOOD PRESSURE: 60 MMHG | HEIGHT: 65 IN | BODY MASS INDEX: 26.04 KG/M2 | HEART RATE: 72 BPM | WEIGHT: 156.31 LBS | SYSTOLIC BLOOD PRESSURE: 118 MMHG

## 2020-06-09 DIAGNOSIS — G40.409 EPILEPSY SYMPTOMATIC, GENERALIZED: ICD-10-CM

## 2020-06-09 DIAGNOSIS — G40.909 SEIZURE DISORDER IN PREGNANCY, ANTEPARTUM, FIRST TRIMESTER: ICD-10-CM

## 2020-06-09 DIAGNOSIS — O99.351 SEIZURE DISORDER IN PREGNANCY, ANTEPARTUM, FIRST TRIMESTER: ICD-10-CM

## 2020-06-09 DIAGNOSIS — R56.9 CONVULSIONS, UNSPECIFIED CONVULSION TYPE: ICD-10-CM

## 2020-06-09 PROCEDURE — 99213 OFFICE O/P EST LOW 20 MIN: CPT | Mod: PBBFAC | Performed by: PSYCHIATRY & NEUROLOGY

## 2020-06-09 PROCEDURE — 99999 PR PBB SHADOW E&M-EST. PATIENT-LVL III: CPT | Mod: PBBFAC,,, | Performed by: PSYCHIATRY & NEUROLOGY

## 2020-06-09 PROCEDURE — 99204 PR OFFICE/OUTPT VISIT, NEW, LEVL IV, 45-59 MIN: ICD-10-PCS | Mod: S$PBB,,, | Performed by: PSYCHIATRY & NEUROLOGY

## 2020-06-09 PROCEDURE — 99204 OFFICE O/P NEW MOD 45 MIN: CPT | Mod: S$PBB,,, | Performed by: PSYCHIATRY & NEUROLOGY

## 2020-06-09 PROCEDURE — 99999 PR PBB SHADOW E&M-EST. PATIENT-LVL III: ICD-10-PCS | Mod: PBBFAC,,, | Performed by: PSYCHIATRY & NEUROLOGY

## 2020-06-09 NOTE — PROGRESS NOTES
Subjective:      Patient ID: Karyn Wells is a 19 y.o. female.    Chief Complaint:   I had a seizure while I was pregnant    The patient indicates that in November, 2019 during a her 1st pregnancy she had at possible seizure event.  The patient states that there was no aura of any warning at all when there was a sudden loss of consciousness that was witnessed by her boyfriend.  According to the patient her boyfriend described the patient as being uncontrollably shaking and unresponsive.  Further details are not available as the boyfriend did not accompany the patient to the office today.  She did recall having a mild headache afterwards.  She does not recall having nausea or vomiting and did not apparently bite her tongue.  She did not have any incontinence.  However she does remember having generalized muscle soreness afterwards and feeling very drowsy.    That event was followed up by an overnight observation at [facility] because of her pregnancy.  The patient does not recall having studies are diagnostic test done at that time but indicates that she may have had a CAT scan.  She was apparently placed on medication which she took briefly but then discontinue the medication in January, 2020.  She has not been on any medications since that time.      Since November, the patient has had at least 1 episode of a possible nocturnal seizure event.  The patient states that her boyfriend described her as again shaking uncontrollably for several minutes and unresponsive to him.  She does not recall the event other than that the description given by her boyfriend.  She does not think that she had any incontinence and did not have any tongue biting.  The patient denies any prior history of seizure or altered awareness of her surroundings.  She denies any involuntary movements.    Her remote history indicates that she is had a minor head injury at age 16 or 17 and was briefly rendered unconscious.  She  denies any prior history of hospitalization for high fever.  She has not had any history of meningitis or encephalitis.  The patient was able to go through the pregnancy with  spontaneous vaginal delivery of a viable female infant.  As far as she can recall, the patient had normal growth and development.          ROS:  GENERAL: NO FEVER, CHILLS, FATIGABILITY OR WEIGHT LOSS.  SKIN: NO RASHES, ITCHING OR CHANGES IN COLOR OR TEXTURE OF SKIN.  HEAD: NO HEADACHES OR RECENT HEAD TRAUMA.  EYES: VISUAL ACUITY FINE. NO PHOTOPHOBIA, OCULAR PAIN OR DIPLOPIA.  EARS: DENIES EAR PAIN, DISCHARGE OR VERTIGO.  NOSE: NO LOSS OF SMELL, NO EPISTAXIS OR POSTNASAL DRIP.  MOUTH & THROAT: NO HOARSENESS OR CHANGE IN VOICE. NO EXCESSIVE GUM BLEEDING.  NODES: DENIES SWOLLEN GLANDS.  CHEST: DENIES SINGH, CYANOSIS, WHEEZING, COUGH AND SPUTUM PRODUCTION.  CARDIOVASCULAR: DENIES CHEST PAIN, PND, ORTHOPNEA OR REDUCED EXERCISE TOLERANCE.  ABDOMEN: APPETITE FINE. NO WEIGHT LOSS. DENIES DIARRHEA, ABDOMINAL PAIN, HEMATEMESIS OR BLOOD IN STOOL.  URINARY: NO FLANK PAIN, DYSURIA OR HEMATURIA.  PERIPHERAL VASCULAR: NO CLAUDICATION OR CYANOSIS.  MUSCULOSKELETAL: NO JOINT STIFFNESS OR SWELLING. DENIES BACK PAIN.  NEUROLOGIC: NO HISTORY OF PARALYSIS, ALTERATION OF GAIT OR COORDINATION.    Past Medical History:   Diagnosis Date    Acute conjunctivitis of left eye 1/1/2020    Decreased fetal movement 2/18/2020    Seizure     SVT (supraventricular tachycardia)      History reviewed. No pertinent surgical history.  Family History   Problem Relation Age of Onset    Breast cancer Neg Hx     Colon cancer Neg Hx     Ovarian cancer Neg Hx     Thrombosis Neg Hx      Social History     Socioeconomic History    Marital status: Single     Spouse name: Not on file    Number of children: Not on file    Years of education: Not on file    Highest education level: Not on file   Occupational History    Not on file   Social Needs    Financial resource strain: Not on  file    Food insecurity:     Worry: Not on file     Inability: Not on file    Transportation needs:     Medical: Not on file     Non-medical: Not on file   Tobacco Use    Smoking status: Never Smoker    Smokeless tobacco: Never Used   Substance and Sexual Activity    Alcohol use: No     Frequency: Never    Drug use: No    Sexual activity: Yes     Partners: Male     Birth control/protection: Implant   Lifestyle    Physical activity:     Days per week: Not on file     Minutes per session: Not on file    Stress: Not on file   Relationships    Social connections:     Talks on phone: Not on file     Gets together: Not on file     Attends [employer] service: Not on file     Active member of club or organization: Not on file     Attends meetings of clubs or organizations: Not on file     Relationship status: Not on file   Other Topics Concern    Not on file   Social History Narrative    Not on file         Objective:   PE:   VITAL SIGNS:   Height 5 ft 5 in, weight 70.9 kg, BMI 26.01  Vitals:    06/09/20 0903   BP: 118/60   Pulse: 72     APPEARANCE: WELL NOURISHED, WELL DEVELOPED, IN NO ACUTE DISTRESS.    HEAD: NORMOCEPHALIC, ATRAUMATIC.  EYES: PERRL. EOMI.  NON-ICTERIC SCLERAE.    EARS: TM'S INTACT. LIGHT REFLEX NORMAL. NO RETRACTION OR PERFORATION.    NOSE: MUCOSA PINK. AIRWAY CLEAR.  MOUTH & THROAT: NO TONSILLAR ENLARGEMENT. NO PHARYNGEAL ERYTHEMA OR EXUDATE. NO STRIDOR.  NECK: SUPPLE. NO BRUITS.  CHEST: LUNGS CLEAR TO AUSCULTATION.  CARDIOVASCULAR: REGULAR RHYTHM WITHOUT SIGNIFICANT MURMURS.  ABDOMEN: BOWEL SOUNDS NORMAL. NOT DISTENDED. SOFT. NO TENDERNESS OR MASSES.  MUSCULOSKELETAL:  NO BONY DEFORMITY SEEN.  MUSCLE TONE AND MUSCLE MASS ARE NORMAL IN BOTH UPPER AND BOTH LOWER EXTREMITIES.    NEUROLOGIC:   MENTAL STATUS:  THE PATIENT IS WELL ORIENTED TO PERSON, TIME, PLACE, AND SITUATION.  THE PATIENT IS ATTENTIVE TO THE ENVIRONMENT AND COOPERATIVE FOR THE EXAM.  CRANIAL NERVES: II-XII GROSSLY INTACT.  VISUAL  ACUITY WITH NEAR CARD IS 20/30 LEFT EYE, RIGHT EYE, AND BOTH EYES, UNAIDED.  THE PATIENT PERCEIVES THE COLOR RED TO BE THE SAME SHADED READ WITH EACH EYE. FUNDOSCOPIC EXAM IS NORMAL.  NO HEMORRHAGE, EXUDATE OR PAPILLEDEMA IS PRESENT. THE EXTRAOCULAR MUSCLES ARE INTACT IN THE CARDINAL DIRECTIONS OF GAZE.  NO PTOSIS IS PRESENT. FACIAL FEATURES ARE SYMMETRICAL.  SPEECH IS NORMAL IN FLUENCY, DICTION, AND PHRASING.  TONGUE PROTRUDES IN THE MIDLINE.    GAIT AND STATION:  ROMBERG IS NEGATIVE.  GOOD ALTERNATE ARMSWING WITH NORMAL GAIT.  THE PATIENT IS ABLE TO STAND TANDEM WITHOUT DIFFICULTY.  MOTOR:  NO DOWNDRIFT OF EITHER ARM WHEN HELD AT SHOULDER LEVEL.  MANUAL MUSCLE TESTING OF PROXIMAL AND DISTAL MUSCLES OF BOTH UPPER AND LOWER EXTREMITIES IS NORMAL.   SENSORY:  INTACT BOTH UPPER AND LOWER EXTREMITIES TO PIN PRICK, TOUCH, AND VIBRATION.  CEREBELLAR:  FINGER TO NOSE DONE WELL.  ALTERNATING MOVEMENTS INTACT.  NO INVOLUNTARY MOVEMENTS OR TREMOR SEEN.  REFLEXES:  STRETCH REFLEXES ARE 2+ BOTH UPPER AND LOWER EXTREMITIES.  PLANTAR STIMULATION IS FLEXOR BILATERALLY AND NO PATHOLOGICAL REFLEXES ARE SEEN              Assessment:     Encounter Diagnoses   Name Primary?    Seizure disorder in pregnancy, antepartum, first trimester     Convulsions, unspecified convulsion type     Epilepsy symptomatic, generalized        Discussion:  The patient has apparently had a well described generalized tonic-clonic seizure during her pregnancy but may have also had a subsequent nocturnal seizure.  However that history is severely lacking as the patient was unaccompanied to the office for this visit.  She will need further workup.  Decision regarding medication administration will depend upon results from the MRI and EEG.  However have also asked the patient to obtain a better description of the nocturnal events from her boyfriend and/ or have the boyfriend accompany her to the office for follow-up visit.      Plan:     1.  Schedule MRI  brain and EEG  2.  Upon completion of those studies, further recommendations will be made regarding medication management.      This was a 55 min visit with the patient with over 50% of the time spent counseling the patient regarding the diagnostic workup and future management.  This note is generated with speech recognition software and is subject to transcription error and sound alike phrases that may be missed by proofreading.

## 2020-06-09 NOTE — LETTER
June 9, 2020      Shalonda Duran CNM  [address] [facility] [address]           [facility]  [address] [facility] [address]  Phone: [phone]  Fax: [phone]          Patient: Karyn Wells   MR Number: [id number]   YOB: 2000   Date of Visit: 6/9/2020       Dear Shalonda Duran:    Thank you for referring Karyn Wells to me for evaluation. Attached you will find relevant portions of my assessment and plan of care.    If you have questions, please do not hesitate to call me. I look forward to following Karyn Wells along with you.    Sincerely,    Asad Mcfarland MD    Enclosure  CC:  No Recipients    If you would like to receive this communication electronically, please contact [email or web address] or [phone] to request more information on [employer] access.    For providers and/or their staff who would like to refer a patient to [facility], please contact us through our one-stop-shop provider referral line, [facility], at [phone].    If you feel you have received this communication in error or would no longer like to receive these types of communications, please e-mail [email or web address]

## 2020-11-20 NOTE — ED PROVIDER NOTES
Encounter Date: 12/4/2019       History     Chief Complaint   Patient presents with    Seizures     possible seizure 2-3 min pta, sig other concerned with anxiety over sz activity     Patient currently presents via POV with concern regarding possible seizure.  Patient is notably pregnant at 23w EGA.  Patient has had similar reactions in the past with seizure-like activity though it has been thought that this is related to an anxiety reaction.  This most recent event occurred just minutes prior to arrival.  Patient had similar episodes just a few days ago and was seen at [facility] where she was placed on Keppra.  She is currently awaiting outpatient neurology follow-up.  Patient appears lethargic at arrival.          Review of patient's allergies indicates:  No Known Allergies  Past Medical History:   Diagnosis Date    Seizure     SVT (supraventricular tachycardia)      History reviewed. No pertinent surgical history.  Family History   Problem Relation Age of Onset    Breast cancer Neg Hx     Colon cancer Neg Hx     Ovarian cancer Neg Hx      Social History     Tobacco Use    Smoking status: Never Smoker    Smokeless tobacco: Never Used   Substance Use Topics    Alcohol use: No     Frequency: Never    Drug use: No     Review of Systems   Constitutional: Negative for chills and fever.   HENT: Negative for congestion and rhinorrhea.    Respiratory: Negative for cough, chest tightness, shortness of breath and wheezing.    Cardiovascular: Negative for chest pain, palpitations and leg swelling.   Gastrointestinal: Negative for abdominal pain, constipation, diarrhea, nausea and vomiting.   Genitourinary: Negative for dysuria, frequency, urgency, vaginal bleeding and vaginal discharge.   Skin: Negative for color change and rash.   Allergic/Immunologic: Negative for immunocompromised state.   Neurological: Negative for dizziness, weakness and numbness.   Hematological: Negative for adenopathy. Does not  [facility] HOSPITALIST ADULT INPATIENT PROGRESS NOTE      DATE OF SERVICE: 11/20/2020    CHIEF COMPLAINT (Reason for Follow Up): GBS Bacteremia    SUBJECTIVE:  Chantale Dorman appears to be comfortable laying in bed on phone, but still reports pain along right upper back and port.  Planned for port removal, and will eventually need permcath replacement when okay with ID.     ROS:  Negative for all 10 systems except as per subjective    HOSPITAL MEDS:  Current Facility-Administered Medications   Medication Dose Route Frequency Provider Last Rate Last Admin   • cefTRIAXone (ROCEPHIN) syringe 2,000 mg  2,000 mg Intravenous Daily Sagar Méndez MD   2,000 mg at 11/20/20 0855   • sodium chloride (NORMAL SALINE) 0.9 % bolus 100-200 mL  100-200 mL Intravenous PRN Simon Wang MD       • sodium chloride (PF) 0.9 % injection 10 mL  10 mL Intracatheter PRN Simon Wang MD       • isosorbide mononitrate (IMDUR) ER tablet 30 mg  30 mg Oral Daily Juvenal Lester MD   30 mg at 11/20/20 0855   • hydrALAZINE (APRESOLINE) tablet 10 mg  10 mg Oral BID Juvenal Lester MD   10 mg at 11/20/20 0855   • HYDROcodone-acetaminophen (NORCO) 5-325 MG per tablet 1 tablet  1 tablet Oral Q4H PRN Myriam Matthew MD       • HYDROcodone-acetaminophen (NORCO)  MG per tablet 1 tablet  1 tablet Oral Q6H PRN Myriam Matthew MD   1 tablet at 11/20/20 0630   • albuterol inhaler 2 puff  2 puff Inhalation BID Francia Patiño MD   2 puff at 11/20/20 0826   • budesonide-formoterol (SYMBICORT) 160-4.5 MCG/ACT inhaler 2 puff  2 puff Inhalation BID Francia Patñio MD   2 puff at 11/20/20 1152   • hydroxychloroquine (PLAQUENIL) tablet 200 mg  200 mg Oral Daily Francia Patiño MD   200 mg at 11/20/20 0855   • sertraline (ZOLOFT) tablet 100 mg  100 mg Oral Daily Francia Patiño MD   100 mg at 11/20/20 0900   • hydrOXYzine (ATARAX) tablet 10 mg  10 mg Oral Q6H PRN Myriam Matthew MD   10 mg at 11/18/20 0604   • heparin (porcine) injection 5,000  Units  5,000 Units Subcutaneous BID Juvenal Lester MD   5,000 Units at 11/17/20 2110   • sodium chloride (NORMAL SALINE) 0.9 % bolus 100-200 mL  100-200 mL Intravenous PRN Monster Perez MD       • albumin human (SPA) 25 % injection 12.5 g  12.5 g Intravenous Once Monster Perez MD       • metoPROLOL succinate (TOPROL-XL) ER tablet 12.5 mg  12.5 mg Oral Daily Juvenal Lester MD   12.5 mg at 11/20/20 0855   • gabapentin (NEURONTIN) capsule 100 mg  100 mg Oral 2 times per day Barbra Branch MD   100 mg at 11/20/20 0855   • LORazepam (ATIVAN) injection 0.26 mg  0.26 mg Intravenous Once Simon Wang MD       • sodium chloride 0.9 % flush bag 25 mL  25 mL Intravenous PRN David Lujan DO       • sodium chloride (PF) 0.9 % injection 2 mL  2 mL Intracatheter 2 times per day David Lujan DO   2 mL at 11/20/20 0855   • sodium polystyrene sulfonate (SPS,KAYEXALATE) suspension 30 g  30 g Oral Once Carrie Carmichael MD       • sodium chloride (NORMAL SALINE) 0.9 % bolus 100-200 mL  100-200 mL Intravenous PRN Michele Mojica MD            OBJECTIVE:    INTAKE/OUTPUT:    Intake/Output Summary (Last 24 hours) at 11/20/2020 1300  Last data filed at 11/19/2020 1900  Gross per 24 hour   Intake 300 ml   Output --   Net 300 ml       LAST RECORDED VITALS:    Temp:  [97.5 °F (36.4 °C)-98.2 °F (36.8 °C)] 98.2 °F (36.8 °C)  Heart Rate:  [92-97] 92  Resp:  [16-17] 17  BP: (103-136)/(62-87) 103/62       SpO2 Readings from Last 3 Encounters:   11/20/20 92%   11/04/20 97%       Body mass index is 28.13 kg/m².    PHYSICAL EXAM:  Constitutional:       General: She is not in acute distress.     Comments: laying in bed  HENT:      Head: Normocephalic.      Mouth/Throat:      Mouth: Mucous membranes are moist.      Pharynx: Oropharynx is clear.   Eyes:      Extraocular Movements: Extraocular movements intact.      Conjunctiva/sclera: Conjunctivae normal.   Cardiovascular:      Rate and Rhythm: Normal rate and regular  bruise/bleed easily.   All other systems reviewed and are negative.      Physical Exam     Initial Vitals [12/04/19 0955]   BP Pulse Resp Temp SpO2   128/70 103 14 98.2 °F (36.8 °C) 100 %      MAP       --         Vitals:    12/04/19 0955 12/04/19 0957 12/04/19 1153 12/04/19 1216   BP: 128/70  119/64 111/60   Pulse: 103 101 88 85   Resp: 14  18 20   Temp: 98.2 °F (36.8 °C)      TempSrc: Oral      SpO2: 100%  99% 99%   Weight: 69.9 kg (154 lb)            Physical Exam    Nursing note and vitals reviewed.  Constitutional: She appears well-developed and well-nourished. She is not diaphoretic. No distress.   HENT:   Head: Normocephalic and atraumatic.   Right Ear: External ear normal.   Left Ear: External ear normal.   Nose: Nose normal.   Mouth/Throat: Oropharynx is clear and moist.   Eyes: Conjunctivae and EOM are normal. Pupils are equal, round, and reactive to light. No scleral icterus.   Neck: Neck supple. No tracheal deviation present. No JVD present.   Cardiovascular: Normal rate, regular rhythm, normal heart sounds and intact distal pulses. Exam reveals no gallop and no friction rub.    No murmur heard.  Pulmonary/Chest: Breath sounds normal. No respiratory distress. She has no wheezes. She has no rhonchi. She has no rales.   Abdominal: Soft. Bowel sounds are normal. She exhibits no distension. There is no tenderness.   Musculoskeletal: Normal range of motion. She exhibits no edema.   Neurological: She is alert and oriented to person, place, and time. She has normal strength. No cranial nerve deficit or sensory deficit. GCS score is 15. GCS eye subscore is 4. GCS verbal subscore is 5. GCS motor subscore is 6.   Skin: Skin is warm and dry. No rash noted.   Psychiatric: She has a normal mood and affect. Her behavior is normal.         ED Course   Procedures  Labs Reviewed   CBC W/ AUTO DIFFERENTIAL - Abnormal; Notable for the following components:       Result Value    RBC 3.61 (*)     Hemoglobin 11.1 (*)      Hematocrit 32.7 (*)     All other components within normal limits   COMPREHENSIVE METABOLIC PANEL - Abnormal; Notable for the following components:    CO2 18 (*)     Glucose 119 (*)     Calcium 8.2 (*)     Albumin 2.9 (*)     AST 48 (*)     All other components within normal limits   URINALYSIS, REFLEX TO URINE CULTURE - Abnormal; Notable for the following components:    Appearance, UA Hazy (*)     Leukocytes, UA 1+ (*)     All other components within normal limits    Narrative:     Preferred Collection Type->Urine, Clean Catch   URINALYSIS MICROSCOPIC - Abnormal; Notable for the following components:    Bacteria Moderate (*)     Yeast, UA Rare (*)     All other components within normal limits    Narrative:     Preferred Collection Type->Urine, Clean Catch   POCT GLUCOSE - Abnormal; Notable for the following components:    POCT Glucose 114 (*)     All other components within normal limits   LACTATE DEHYDROGENASE   MAGNESIUM   DRUG SCREEN PANEL, URINE EMERGENCY    Narrative:     Preferred Collection Type->Urine, Clean Catch   ALCOHOL,MEDICAL (ETHANOL)   PROTEIN / CREATININE RATIO, URINE   PROTEIN / CREATININE RATIO, URINE    Narrative:     Preferred Collection Type->Urine, Clean Catch          Imaging Results    None          Medical Decision Making:   ED Management:  Pertinent findings were discussed with the obstetrician on-call who recommends outpatient neurology follow-up and ongoing use of Keppra.  Patient has been counseled regarding all seizure precautions though it remains unclear whether or not this represents true seizure activity.  No evidence to support eclampsia.  Notably patient has OB FU schedule for tomorrow here at [facility].    All findings were reviewed with the patient/family in detail.  Patient remains AAO and HD stable.  She reports that this was likely an anxiety reaction prompted by an argument this AM.  I see no indication of an emergent process beyond that addressed during our encounter but  rhythm.      Heart sounds: No murmur.   Pulmonary:      Comments: Lungs are clear to auscultations, respirations are non-labored, breath sounds equal  Abdominal:      General: Bowel sounds are normal. There is no distension.      Palpations: Abdomen is soft.      Tenderness: There is no abdominal tenderness.   Skin:     General: Skin is warm and moist.      Findings: No rash.   Neurological:      General: No focal deficit present.      Mental Status: She is alert. Mental status is at baseline.      Comments: Speech clear and coherent   Psychiatric:      Comments: Cooperative, appropriate mood and affect     LABS:    Recent Results (from the past 24 hour(s))   Blood Culture    Collection Time: 11/19/20  4:40 PM    Specimen: Blood   Result Value Ref Range    Specimen Description BLOOD BLOOD     CULTURE NO GROWTH <24 HRS.     REPORT STATUS PENDING    Blood Culture    Collection Time: 11/19/20  4:50 PM    Specimen: Blood   Result Value Ref Range    Specimen Description BLOOD BLOOD     CULTURE NO GROWTH <24 HRS.     REPORT STATUS PENDING    CBC with Automated Differential    Collection Time: 11/20/20  6:30 AM   Result Value Ref Range    WBC 12.8 (H) 4.2 - 11.0 K/mcL    RBC 3.07 (L) 4.00 - 5.20 mil/mcL    HGB 9.4 (L) 12.0 - 15.5 g/dL    HCT 29.3 (L) 36.0 - 46.5 %    MCV 95.4 78.0 - 100.0 fl    MCH 30.6 26.0 - 34.0 pg    MCHC 32.1 32.0 - 36.5 g/dL    RDW-CV 17.2 (H) 11.0 - 15.0 %    PLT 91 (L) 140 - 450 K/mcL    NRBC 0 0 /100 WBC    DIFF TYPE AUTOMATED DIFFERENTIAL     Neutrophil 88 %    LYMPH 5 %    MONO 5 %    EOSIN 0 %    BASO 0 %    Percent Immature Granuloctyes 2 %    Absolute Neutrophil 11.3 (H) 1.8 - 7.7 K/mcL    Absolute Lymph 0.7 (L) 1.0 - 4.8 K/mcL    Absolute Mono 0.6 0.3 - 0.9 K/mcL    Absolute Eos 0.0 (L) 0.1 - 0.5 K/mcL    Absolute Baso 0.0 0.0 - 0.3 K/mcL    Absolute Immature Granulocytes 0.2 0 - 0.2 K/mcl   Basic Metabolic Panel    Collection Time: 11/20/20  6:30 AM   Result Value Ref Range    Sodium 134  (L) 135 - 145 mmol/L    Potassium 4.6 3.4 - 5.1 mmol/L    Chloride 95 (L) 98 - 107 mmol/L    Carbon Dioxide 22 21 - 32 mmol/L    Anion Gap 22 (H) 10 - 20 mmol/L    Glucose 113 (H) 65 - 99 mg/dL    BUN 76 (H) 6 - 20 mg/dL    Creatinine 9.87 (H) 0.51 - 0.95 mg/dL    GFR Estimate,  6     GFR Estimate, Non African American 5     BUN/Creatinine Ratio 8 7 - 25    CALCIUM 5.0 (LL) 8.4 - 10.2 mg/dL       IMAGING:    IR PERMANENT DIALYSIS CATHETER REMOVAL   Final Result   Impression:   Successful removal of the right IJ tunneled dialysis catheter.      Electronically Signed by: JN KENYON MD    Signed on: 11/18/2020 3:13 PM          CT ABDOMEN PELVIS W CONTRAST   Final Result      1.  Mild anasarca.     2.  No evidence of site for origin of bacteremia in the abdomen and pelvis.  Possibility of permacath infection in the chest.   3.  Extensive anterior abdominal and chest wall venous collaterals may reflect venous stenosis or occlusion somewhere in the pelvis or at the IVC filter.   4.  Small kidneys completely replaced by cysts.  No hydronephrosis.      Electronically Signed by: VICTOR HUGO BENAVIDES    Signed on: 11/18/2020 7:23 AM          US VASC LOWER EXTREMITY VENOUS DUPLEX BILATERAL   Final Result      1.  No evidence of lower extremity venous thrombosis.      2.  Fluid within the subcutaneous soft tissues of the left none.      Electronically Signed by: MARCIA ISBELL MD    Signed on: 11/17/2020 12:48 PM          US CHEST   Final Result   FINDINGS/IMPRESSION:       Targeted ultrasound of the left breast and axilla was obtained. There is diffuse soft tissue edema in the left breast which could be related to mastitis, although inflammatory carcinoma cannot be excluded. No definite fluid collection to suggest an    abscess. Clinical correlation and correlation with laboratory values recommended.        Prominent left axillary lymph nodes could be reactive. The largest lymph node measures 2.5 x 0.9 x 1.7  have duly counseled the patient/family regarding the need for prompt follow-up as well as the indications that should prompt immediate return to the emergency room should new or worrisome developments occur.  The patient has additionally been provided with printed information regarding diagnosis as well as instructions regarding follow up and any medications intended to manage the patient's aforementioned conditions.  The patient/family communicates understanding of all this information and all remaining questions and concerns were addressed at this time.                                       Clinical Impression:       ICD-10-CM ICD-9-CM   1. 23 weeks gestation of pregnancy Z3A.23 V22.2   2. Anxiety reaction F41.1 300.00                             Bala Rosas MD  12/04/19 1601       Bala Rosas MD  12/04/19 1601     cm. Short interval follow-up recommended to ensure resolution.      Repeat ultrasound and diagnostic mammogram after completion of antibiotic treatment is also recommended to ensure resolution of findings.       Electronically Signed by: LORIE HOLGUIN M.D.    Signed on: 11/18/2020 5:03 PM          CTA CHEST PULMONARY EMBOLISM W CONTRAST   Final Result   Impression: No CT evidence for pulmonary embolus. Multifocal ill-defined opacities in both lung bases most likely atelectasis versus infiltrate left greater than right.      Bilateral pleural effusion.      Vascular congestion cardiomegaly consistent with CHF.      Severe anasarca.       Infiltration of the mediastinum, mediastinal and hilar adenopathy. Correlate for mediastinitis.       Peripancreatic infiltration with partially imaged abdomen raising suspicion of pancreatitis. Please see above.       Diffuse osteosclerosis as described.      Electronically Signed by: LINDSAY BLACK MD    Signed on: 11/16/2020 6:56 PM          XR CHEST PA OR AP 1 VIEW   Final Result   FINDINGS/IMPRESSION:        There is a Port-A-Cath and a right-sided central line catheter in the cavoatrial junction in place.  Mild cardiomegaly.  There is a small left pleural effusion and left basilar opacity.  Mild pulmonary vascular congestion.  No gross thorax.  Scoliosis.      Electronically Signed by: LORIE HOLGUIN M.D.    Signed on: 11/16/2020 12:58 PM          IR CHEST PORT REMOVAL    (Results Pending)       CULTURES:  Microbiology Results  (Last 10 results in the past 7 days)    Specimen   Gram Smear   Culture Result   Status      11/17/20  1050  11/16/20  1300  11/17/20  1050  11/17/20  1050     BLOOD Blood, Dialysis Catheter GRAM POSITIVE COCCI IN CHAINS NO GROWTH 2 DAYS. PENDING      (CRITICAL/ALERT VALUE)          CALLED TO, READ BACK AND CONFIRMED CYNTHIA PIEDRA(RN) ON 11/17/20 AT 0336 BY [id number].        11/17/20  1045  11/16/20  1245  11/17/20  1045  11/17/20  1045     BLOOD Blood, Dialysis  Catheter GRAM POSITIVE COCCI IN CHAINS NO GROWTH 2 DAYS. PENDING      (CRITICAL/ALERT VALUE)          CALLED TO, READ BACK AND CONFIRMED CYNTHIA PIEDRA(RN) ON 11/17/20 AT 0336 BY [id number].        11/16/20  1300   11/16/20  1300  11/16/20  1300     BLOOD BLOOD   STREPTOCOCCUS AGALACTIAE (STREP GROUP B) (Same isolate identified, repeat susceptibility not performed. Call microbiology if sensitivity desired.) 11/18/2020 FINAL    11/16/20  1245   11/16/20  1245  11/16/20  1245     BLOOD BLOOD   STREPTOCOCCUS AGALACTIAE (STREP GROUP B) 11/18/2020 FINAL          ASSESSMENT & PLAN:  Chantale Dorman is a 25 y/o with ESRD on HD s/p failed renal transplant, Lupus, Systolic CHF (EF40%) and chronic pleural effusion s/p VATS 8/2020 who presented with back and left breast pain.    GBS bacteremia w/sepsis present on admission; now found to have R. Chest Portocath Infection (thrombus/vegetation at tip)  - Consult ID, Dr. Méndez, appreciate recs  - Blood Cx 11/16: GBS in 2 of 2 bottles  - Blood Cx 11/17: NGTD  - Blood Cx 11/19: NGTD  - Removing Permacath (11/18) after dialysis with plan for repeat cultures --> plan for replacement on 11/23 per ID  - Echo 11/17: EF 37% with moderate global hypokinesis and diastolic dysfunction, dilated ventricles  - MAGAIL 11/19: w/thrombus/Vegetation on Portcath tip  - IR for Port Removal  - Continue Cefepime and Vancomycin  - Trend Temps and WBC     Hyperkalemia due to missed dialysis:  - Improving with dialysis, and medical treatement  - Consult Nephrology, Dr. Wang, appreciate recs  - Missed 1 week of dialysis prior to admission due to malfunction with catheter  - Monitor as plan for line holiday tenatively until 11/23/2020     Acute exacerbation of chronic systolic heart failure  - Consult Cardiology, Dr. Lester, appreciate recs  - BNP [id number] with anasarca  - Holding ACE/ARB and spironolactone due to hyperkalemia  - Started on metoprolol and Imdur  - Fluid management via dialysis given anuric     Left  breast pain:  - Consult Surgeon, Dr. Barker, appreciate recs  - Bx 8/4/2020: Superficial and deep perivascular inflammation and dermatitis with occasional plasma cells   - [facility] at [facility] previously recommended breast imaging with US and mammogram  - US chest 11/17: Diffuse soft tissue edema, no definite fluid collection, prominent left axillary lymph nodes could be reactive, repeat US and diagnostic mammogram after completion of antibiotic treatment is recommended to ensure resolution of findings  - Will need outpatient mammogram  - Continue w/[place] for pain and stop dilaudid    ESRD on HD with pruritis  - Consult Nephrology, Dr. Wang, appreciate recs  - Will need close monitoring of acidosis and electrolytes.  - Hydroxyzine prn pruritis, persistently requests IV Benadryl for itching, would avoid this unless having allergic reaction, patient reports nephrology gives to her before every dialysis, will defer to them on need     Hypocalcemia: Replaced  HTN: Continue hydralazine  Chronic effusion s/p VATS: Continue Symbicort and albuterol inhaler  Lupus with Chronic pain: Continue Plaquenil, Sertraline, and gabapentin  Paroxysmal atrial fibrillation, h/o PE: Consult Cardiology, Dr. Lester, appreciate recs. [id number] 2. Continue metoprolol. Consider anticoagulation at discharge.     FEN: Renal Diet  DVT PPx: SC Heparin  CODE STATUS:   Code Status: Full Resuscitation  DISPO: Ongoing inpatient level of care, portocath removal and replacement of permacath tentatively 11/23 assuming negative BCx, and plan plan for abx prior to discharge.    PRIMARY CARE PHYSICIAN:  No Pcp    Milton Salomon MD  [facility] Hospitalist  11/20/2020 1:00 PM

## 2021-04-29 ENCOUNTER — PATIENT MESSAGE (OUTPATIENT)
Dept: RESEARCH | Facility: HOSPITAL | Age: 21
End: 2021-04-29

## 2022-03-29 ENCOUNTER — HOSPITAL ENCOUNTER (EMERGENCY)
Facility: HOSPITAL | Age: 22
Discharge: HOME | End: 2022-03-29
Attending: EMERGENCY MEDICINE
Payer: MEDICAID

## 2022-03-29 VITALS
OXYGEN SATURATION: 100 % | SYSTOLIC BLOOD PRESSURE: 112 MMHG | BODY MASS INDEX: 30.01 KG/M2 | DIASTOLIC BLOOD PRESSURE: 76 MMHG | WEIGHT: 180.13 LBS | HEART RATE: 85 BPM | HEIGHT: 65 IN | TEMPERATURE: 99 F | RESPIRATION RATE: 16 BRPM

## 2022-03-29 DIAGNOSIS — J10.1 INFLUENZA A: Primary | ICD-10-CM

## 2022-03-29 DIAGNOSIS — R11.2 NON-INTRACTABLE VOMITING WITH NAUSEA, UNSPECIFIED VOMITING TYPE: ICD-10-CM

## 2022-03-29 DIAGNOSIS — E87.6 HYPOKALEMIA: ICD-10-CM

## 2022-03-29 LAB
ALBUMIN SERPL BCP-MCNC: 3.9 G/DL (ref 3.5–5.2)
ALP SERPL-CCNC: 79 U/L (ref 55–135)
ALT SERPL W/O P-5'-P-CCNC: 15 U/L (ref 10–44)
ANION GAP SERPL CALC-SCNC: 14 MMOL/L (ref 8–16)
AST SERPL-CCNC: 19 U/L (ref 10–40)
BASOPHILS # BLD AUTO: 0.03 K/UL (ref 0–0.2)
BASOPHILS NFR BLD: 0.9 % (ref 0–1.9)
BILIRUB SERPL-MCNC: 0.3 MG/DL (ref 0.1–1)
BUN SERPL-MCNC: 9 MG/DL (ref 6–20)
CALCIUM SERPL-MCNC: 8.4 MG/DL (ref 8.7–10.5)
CHLORIDE SERPL-SCNC: 103 MMOL/L (ref 95–110)
CO2 SERPL-SCNC: 19 MMOL/L (ref 23–29)
CREAT SERPL-MCNC: 0.9 MG/DL (ref 0.5–1.4)
CTP QC/QA: YES
CTP QC/QA: YES
DIFFERENTIAL METHOD: ABNORMAL
EOSINOPHIL # BLD AUTO: 0 K/UL (ref 0–0.5)
EOSINOPHIL NFR BLD: 0 % (ref 0–8)
ERYTHROCYTE [DISTWIDTH] IN BLOOD BY AUTOMATED COUNT: 13.7 % (ref 11.5–14.5)
EST. GFR  (AFRICAN AMERICAN): >60 ML/MIN/1.73 M^2
EST. GFR  (NON AFRICAN AMERICAN): >60 ML/MIN/1.73 M^2
GLUCOSE SERPL-MCNC: 99 MG/DL (ref 70–110)
HCT VFR BLD AUTO: 41.6 % (ref 37–48.5)
HGB BLD-MCNC: 14.2 G/DL (ref 12–16)
IMM GRANULOCYTES # BLD AUTO: 0.01 K/UL (ref 0–0.04)
IMM GRANULOCYTES NFR BLD AUTO: 0.3 % (ref 0–0.5)
LIPASE SERPL-CCNC: 26 U/L (ref 4–60)
LYMPHOCYTES # BLD AUTO: 1.5 K/UL (ref 1–4.8)
LYMPHOCYTES NFR BLD: 45 % (ref 18–48)
MCH RBC QN AUTO: 29.8 PG (ref 27–31)
MCHC RBC AUTO-ENTMCNC: 34.1 G/DL (ref 32–36)
MCV RBC AUTO: 87 FL (ref 82–98)
MONOCYTES # BLD AUTO: 0.5 K/UL (ref 0.3–1)
MONOCYTES NFR BLD: 13.2 % (ref 4–15)
NEUTROPHILS # BLD AUTO: 1.4 K/UL (ref 1.8–7.7)
NEUTROPHILS NFR BLD: 40.6 % (ref 38–73)
NRBC BLD-RTO: 0 /100 WBC
PLATELET # BLD AUTO: 194 K/UL (ref 150–450)
PMV BLD AUTO: 10.9 FL (ref 9.2–12.9)
POC MOLECULAR INFLUENZA A AGN: POSITIVE
POC MOLECULAR INFLUENZA B AGN: NEGATIVE
POTASSIUM SERPL-SCNC: 3.2 MMOL/L (ref 3.5–5.1)
PROT SERPL-MCNC: 7.5 G/DL (ref 6–8.4)
RBC # BLD AUTO: 4.76 M/UL (ref 4–5.4)
SARS-COV-2 RDRP RESP QL NAA+PROBE: NEGATIVE
SODIUM SERPL-SCNC: 136 MMOL/L (ref 136–145)
WBC # BLD AUTO: 3.4 K/UL (ref 3.9–12.7)

## 2022-03-29 PROCEDURE — 63600175 PHARM REV CODE 636 W HCPCS: Mod: ER | Performed by: EMERGENCY MEDICINE

## 2022-03-29 PROCEDURE — 85025 COMPLETE CBC W/AUTO DIFF WBC: CPT | Mod: ER | Performed by: EMERGENCY MEDICINE

## 2022-03-29 PROCEDURE — 83690 ASSAY OF LIPASE: CPT | Mod: ER | Performed by: EMERGENCY MEDICINE

## 2022-03-29 PROCEDURE — 25000003 PHARM REV CODE 250: Mod: ER | Performed by: EMERGENCY MEDICINE

## 2022-03-29 PROCEDURE — U0002 COVID-19 LAB TEST NON-CDC: HCPCS | Mod: ER | Performed by: EMERGENCY MEDICINE

## 2022-03-29 PROCEDURE — 99284 EMERGENCY DEPT VISIT MOD MDM: CPT | Mod: 25,ER

## 2022-03-29 PROCEDURE — 96365 THER/PROPH/DIAG IV INF INIT: CPT | Mod: ER

## 2022-03-29 PROCEDURE — 80053 COMPREHEN METABOLIC PANEL: CPT | Mod: ER | Performed by: EMERGENCY MEDICINE

## 2022-03-29 RX ORDER — PROMETHAZINE HYDROCHLORIDE AND DEXTROMETHORPHAN HYDROBROMIDE 6.25; 15 MG/5ML; MG/5ML
5 SYRUP ORAL EVERY 6 HOURS PRN
Qty: 120 ML | Refills: 0 | Status: SHIPPED | OUTPATIENT
Start: 2022-03-29 | End: 2022-04-08

## 2022-03-29 RX ADMIN — POTASSIUM BICARBONATE 20 MEQ: 391 TABLET, EFFERVESCENT ORAL at 07:03

## 2022-03-29 RX ADMIN — PROMETHAZINE HYDROCHLORIDE 12.5 MG: 25 INJECTION INTRAMUSCULAR; INTRAVENOUS at 06:03

## 2022-03-29 RX ADMIN — SODIUM CHLORIDE 1000 ML: 0.9 INJECTION, SOLUTION INTRAVENOUS at 06:03

## 2022-03-29 NOTE — ED PROVIDER NOTES
Encounter Date: 3/29/2022       History     Chief Complaint   Patient presents with    GI Problem     C/o vomiting that started aprox 3-4 days ago; pt states she has also been having chills and fever      The history is provided by the patient.   GI Problem  The other symptoms of the illness include nausea and vomiting. The other symptoms of the illness do not include fever, shortness of breath, dysuria or vaginal discharge.   Symptoms associated with the illness do not include back pain.     Review of patient's allergies indicates:  No Known Allergies  Past Medical History:   Diagnosis Date    Acute conjunctivitis of left eye 1/1/2020    Decreased fetal movement 2/18/2020    Seizure     SVT (supraventricular tachycardia)      No past surgical history on file.  Family History   Problem Relation Age of Onset    Breast cancer Neg Hx     Colon cancer Neg Hx     Ovarian cancer Neg Hx     Thrombosis Neg Hx      Social History     Tobacco Use    Smoking status: Never Smoker    Smokeless tobacco: Never Used   Substance Use Topics    Alcohol use: No    Drug use: No     Review of Systems   Constitutional: Negative for fever.   HENT: Negative for sore throat.    Respiratory: Negative for shortness of breath.    Cardiovascular: Negative for chest pain.   Gastrointestinal: Positive for nausea and vomiting.   Genitourinary: Negative for dysuria and vaginal discharge.   Musculoskeletal: Negative for back pain.   Skin: Negative for rash.   Neurological: Negative for weakness.   Hematological: Does not bruise/bleed easily.       Physical Exam     Initial Vitals [03/29/22 0616]   BP Pulse Resp Temp SpO2   122/73 107 19 98.8 °F (37.1 °C) 99 %      MAP       --         Physical Exam    Nursing note and vitals reviewed.  Constitutional: She appears well-developed and well-nourished. No distress.   HENT:   Head: Normocephalic and atraumatic.   Mouth/Throat: Oropharynx is clear and moist.   Eyes: Conjunctivae and EOM are  normal. Pupils are equal, round, and reactive to light.   Neck: Neck supple.   Normal range of motion.  Cardiovascular: Normal rate, regular rhythm and normal heart sounds. Exam reveals no gallop and no friction rub.    No murmur heard.  Pulmonary/Chest: Breath sounds normal. No respiratory distress. She has no wheezes. She has no rhonchi. She has no rales.   Abdominal: Abdomen is soft. Bowel sounds are normal. She exhibits no distension and no mass. There is no abdominal tenderness. There is no rebound and no guarding.   Musculoskeletal:         General: No tenderness or edema. Normal range of motion.      Cervical back: Normal range of motion and neck supple.     Neurological: She is alert and oriented to person, place, and time. She has normal strength.   Skin: Skin is warm and dry. No rash noted.   Psychiatric: She has a normal mood and affect. Thought content normal.         ED Course   Procedures  Labs Reviewed   CBC W/ AUTO DIFFERENTIAL - Abnormal; Notable for the following components:       Result Value    WBC 3.40 (*)     Gran # (ANC) 1.4 (*)     All other components within normal limits   COMPREHENSIVE METABOLIC PANEL - Abnormal; Notable for the following components:    Potassium 3.2 (*)     CO2 19 (*)     Calcium 8.4 (*)     All other components within normal limits   POCT INFLUENZA A/B MOLECULAR - Abnormal; Notable for the following components:    POC Molecular Influenza A Ag Positive (*)     All other components within normal limits   LIPASE   URINALYSIS, REFLEX TO URINE CULTURE   PREGNANCY TEST, URINE RAPID   SARS-COV-2 RDRP GENE          Imaging Results    None          Medications   sodium chloride 0.9% bolus 1,000 mL (1,000 mLs Intravenous New Bag 3/29/22 0639)   potassium bicarbonate disintegrating tablet 20 mEq (has no administration in time range)   promethazine (PHENERGAN) 12.5 mg in dextrose 5 % 50 mL IVPB (12.5 mg Intravenous New Bag 3/29/22 0639)     ED Vital Signs:  Vitals:    03/29/22 0616  "  BP: 122/73   Pulse: 107   Resp: 19   Temp: 98.8 °F (37.1 °C)   TempSrc: Oral   SpO2: 99%   Weight: 81.7 kg (180 lb 1.9 oz)   Height: 5' 5" (1.651 m)         Abnormal Lab Results:  Labs Reviewed   CBC W/ AUTO DIFFERENTIAL - Abnormal; Notable for the following components:       Result Value    WBC 3.40 (*)     Gran # (ANC) 1.4 (*)     All other components within normal limits   COMPREHENSIVE METABOLIC PANEL - Abnormal; Notable for the following components:    Potassium 3.2 (*)     CO2 19 (*)     Calcium 8.4 (*)     All other components within normal limits   POCT INFLUENZA A/B MOLECULAR - Abnormal; Notable for the following components:    POC Molecular Influenza A Ag Positive (*)     All other components within normal limits   LIPASE   URINALYSIS, REFLEX TO URINE CULTURE   PREGNANCY TEST, URINE RAPID   SARS-COV-2 RDRP GENE          All Lab Results:  Results for orders placed or performed during the hospital encounter of 03/29/22   CBC Auto Differential   Result Value Ref Range    WBC 3.40 (L) 3.90 - 12.70 K/uL    RBC 4.76 4.00 - 5.40 M/uL    Hemoglobin 14.2 12.0 - 16.0 g/dL    Hematocrit 41.6 37.0 - 48.5 %    MCV 87 82 - 98 fL    MCH 29.8 27.0 - 31.0 pg    MCHC 34.1 32.0 - 36.0 g/dL    RDW 13.7 11.5 - 14.5 %    Platelets 194 150 - 450 K/uL    MPV 10.9 9.2 - 12.9 fL    Immature Granulocytes 0.3 0.0 - 0.5 %    Gran # (ANC) 1.4 (L) 1.8 - 7.7 K/uL    Immature Grans (Abs) 0.01 0.00 - 0.04 K/uL    Lymph # 1.5 1.0 - 4.8 K/uL    Mono # 0.5 0.3 - 1.0 K/uL    Eos # 0.0 0.0 - 0.5 K/uL    Baso # 0.03 0.00 - 0.20 K/uL    nRBC 0 0 /100 WBC    Gran % 40.6 38.0 - 73.0 %    Lymph % 45.0 18.0 - 48.0 %    Mono % 13.2 4.0 - 15.0 %    Eosinophil % 0.0 0.0 - 8.0 %    Basophil % 0.9 0.0 - 1.9 %    Differential Method Automated    Comprehensive Metabolic Panel   Result Value Ref Range    Sodium 136 136 - 145 mmol/L    Potassium 3.2 (L) 3.5 - 5.1 mmol/L    Chloride 103 95 - 110 mmol/L    CO2 19 (L) 23 - 29 mmol/L    Glucose 99 70 - 110 " mg/dL    BUN 9 6 - 20 mg/dL    Creatinine 0.9 0.5 - 1.4 mg/dL    Calcium 8.4 (L) 8.7 - 10.5 mg/dL    Total Protein 7.5 6.0 - 8.4 g/dL    Albumin 3.9 3.5 - 5.2 g/dL    Total Bilirubin 0.3 0.1 - 1.0 mg/dL    Alkaline Phosphatase 79 55 - 135 U/L    AST 19 10 - 40 U/L    ALT 15 10 - 44 U/L    Anion Gap 14 8 - 16 mmol/L    eGFR if African American >60.0 >60 mL/min/1.73 m^2    eGFR if non African American >60.0 >60 mL/min/1.73 m^2   Lipase   Result Value Ref Range    Lipase 26 4 - 60 U/L   POCT COVID-19 Rapid Screening   Result Value Ref Range    POC Rapid COVID Negative Negative     Acceptable Yes    POCT Influenza A/B Molecular   Result Value Ref Range    POC Molecular Influenza A Ag Positive (A) Negative, Not Reported    POC Molecular Influenza B Ag Negative Negative, Not Reported     Acceptable Yes            Imaging Results:  Imaging Results    None            The Emergency Provider reviewed the vital signs and test results, which are outlined above.    ED Discussions:  7:23 AM: Reassessed pt at this time.  Pt states her condition has improved at this time. Discussed with pt all pertinent ED information and results. Discussed pt dx of influenza A and plan of tx. Gave pt all f/u and return to the ED instructions. All questions and concerns were addressed at this time. Pt expresses understanding of information and instructions, and is comfortable with plan to discharge. Pt is stable for discharge.                                 Clinical Impression:   Final diagnoses:  [J10.1] Influenza A (Primary)  [R11.2] Non-intractable vomiting with nausea, unspecified vomiting type  [E87.6] Hypokalemia          ED Disposition Condition    Discharge Stable        ED Prescriptions     Medication Sig Dispense Start Date End Date Auth. Provider    promethazine-dextromethorphan (PROMETHAZINE-DM) 6.25-15 mg/5 mL Syrp Take 5 mLs by mouth every 6 (six) hours as needed. 120 mL 3/29/2022 4/8/2022 Marky CHAN  MD Dru        Follow-up Information     Follow up With Specialties Details Why Contact Info    [facility] [place]  Call in 1 day  [id number]   [place] [address]  [phone]             Marky Gonsales MD  03/29/22 07

## 2023-06-24 ENCOUNTER — HOSPITAL ENCOUNTER (EMERGENCY)
Facility: HOSPITAL | Age: 23
Discharge: HOME | End: 2023-06-24
Attending: EMERGENCY MEDICINE
Payer: MEDICAID

## 2023-06-24 VITALS
BODY MASS INDEX: 25.31 KG/M2 | TEMPERATURE: 98 F | HEART RATE: 72 BPM | DIASTOLIC BLOOD PRESSURE: 65 MMHG | RESPIRATION RATE: 20 BRPM | OXYGEN SATURATION: 99 % | WEIGHT: 152.13 LBS | SYSTOLIC BLOOD PRESSURE: 113 MMHG

## 2023-06-24 DIAGNOSIS — F12.90 CANNABINOID HYPEREMESIS SYNDROME: Primary | ICD-10-CM

## 2023-06-24 DIAGNOSIS — R11.2 CANNABINOID HYPEREMESIS SYNDROME: Primary | ICD-10-CM

## 2023-06-24 DIAGNOSIS — R41.82 ALTERED MENTAL STATUS: ICD-10-CM

## 2023-06-24 LAB
ALBUMIN SERPL BCP-MCNC: 3.7 G/DL (ref 3.5–5.2)
ALP SERPL-CCNC: 77 U/L (ref 55–135)
ALT SERPL W/O P-5'-P-CCNC: 9 U/L (ref 10–44)
AMPHET+METHAMPHET UR QL: NEGATIVE
ANION GAP SERPL CALC-SCNC: 9 MMOL/L (ref 8–16)
APAP SERPL-MCNC: <3 UG/ML (ref 10–20)
AST SERPL-CCNC: 13 U/L (ref 10–40)
B-HCG UR QL: NEGATIVE
BACTERIA #/AREA URNS HPF: ABNORMAL /HPF
BARBITURATES UR QL SCN>200 NG/ML: NEGATIVE
BASOPHILS # BLD AUTO: 0.03 K/UL (ref 0–0.2)
BASOPHILS NFR BLD: 0.3 % (ref 0–1.9)
BENZODIAZ UR QL SCN>200 NG/ML: NEGATIVE
BILIRUB SERPL-MCNC: 0.2 MG/DL (ref 0.1–1)
BILIRUB UR QL STRIP: NEGATIVE
BUN SERPL-MCNC: 10 MG/DL (ref 6–20)
BZE UR QL SCN: NEGATIVE
CALCIUM SERPL-MCNC: 8.7 MG/DL (ref 8.7–10.5)
CANNABINOIDS UR QL SCN: ABNORMAL
CHLORIDE SERPL-SCNC: 110 MMOL/L (ref 95–110)
CLARITY UR: CLEAR
CO2 SERPL-SCNC: 19 MMOL/L (ref 23–29)
COLOR UR: YELLOW
CREAT SERPL-MCNC: 0.8 MG/DL (ref 0.5–1.4)
CREAT UR-MCNC: 71.8 MG/DL (ref 15–325)
CRP SERPL-MCNC: 1.6 MG/L (ref 0–8.2)
DIFFERENTIAL METHOD: NORMAL
EOSINOPHIL # BLD AUTO: 0.1 K/UL (ref 0–0.5)
EOSINOPHIL NFR BLD: 0.8 % (ref 0–8)
ERYTHROCYTE [DISTWIDTH] IN BLOOD BY AUTOMATED COUNT: 12.1 % (ref 11.5–14.5)
EST. GFR  (NO RACE VARIABLE): >60 ML/MIN/1.73 M^2
ETHANOL SERPL-MCNC: <10 MG/DL
GLUCOSE SERPL-MCNC: 104 MG/DL (ref 70–110)
GLUCOSE UR QL STRIP: NEGATIVE
HCT VFR BLD AUTO: 38.7 % (ref 37–48.5)
HGB BLD-MCNC: 13.2 G/DL (ref 12–16)
HGB UR QL STRIP: NEGATIVE
IMM GRANULOCYTES # BLD AUTO: 0.04 K/UL (ref 0–0.04)
IMM GRANULOCYTES NFR BLD AUTO: 0.4 % (ref 0–0.5)
KETONES UR QL STRIP: NEGATIVE
LEUKOCYTE ESTERASE UR QL STRIP: NEGATIVE
LYMPHOCYTES # BLD AUTO: 2.3 K/UL (ref 1–4.8)
LYMPHOCYTES NFR BLD: 25.7 % (ref 18–48)
MCH RBC QN AUTO: 30.2 PG (ref 27–31)
MCHC RBC AUTO-ENTMCNC: 34.1 G/DL (ref 32–36)
MCV RBC AUTO: 89 FL (ref 82–98)
METHADONE UR QL SCN>300 NG/ML: NEGATIVE
MICROSCOPIC COMMENT: ABNORMAL
MONOCYTES # BLD AUTO: 0.6 K/UL (ref 0.3–1)
MONOCYTES NFR BLD: 6.4 % (ref 4–15)
NEUTROPHILS # BLD AUTO: 6 K/UL (ref 1.8–7.7)
NEUTROPHILS NFR BLD: 66.4 % (ref 38–73)
NITRITE UR QL STRIP: POSITIVE
NRBC BLD-RTO: 0 /100 WBC
OPIATES UR QL SCN: NEGATIVE
PCP UR QL SCN>25 NG/ML: NEGATIVE
PH UR STRIP: 8 [PH] (ref 5–8)
PLATELET # BLD AUTO: 221 K/UL (ref 150–450)
PMV BLD AUTO: 11.1 FL (ref 9.2–12.9)
POTASSIUM SERPL-SCNC: 3.7 MMOL/L (ref 3.5–5.1)
PROT SERPL-MCNC: 7 G/DL (ref 6–8.4)
PROT UR QL STRIP: NEGATIVE
RBC # BLD AUTO: 4.37 M/UL (ref 4–5.4)
SALICYLATES SERPL-MCNC: <5 MG/DL (ref 15–30)
SODIUM SERPL-SCNC: 138 MMOL/L (ref 136–145)
SP GR UR STRIP: 1.01 (ref 1–1.03)
TOXICOLOGY INFORMATION: ABNORMAL
TROPONIN I SERPL DL<=0.01 NG/ML-MCNC: <0.006 NG/ML (ref 0–0.03)
URN SPEC COLLECT METH UR: ABNORMAL
UROBILINOGEN UR STRIP-ACNC: NEGATIVE EU/DL
WBC # BLD AUTO: 9.06 K/UL (ref 3.9–12.7)

## 2023-06-24 PROCEDURE — 93010 ELECTROCARDIOGRAM REPORT: CPT | Mod: ,,, | Performed by: INTERNAL MEDICINE

## 2023-06-24 PROCEDURE — 81000 URINALYSIS NONAUTO W/SCOPE: CPT | Mod: 59 | Performed by: NURSE PRACTITIONER

## 2023-06-24 PROCEDURE — 96365 THER/PROPH/DIAG IV INF INIT: CPT | Mod: 59

## 2023-06-24 PROCEDURE — 86140 C-REACTIVE PROTEIN: CPT | Performed by: EMERGENCY MEDICINE

## 2023-06-24 PROCEDURE — 93005 ELECTROCARDIOGRAM TRACING: CPT

## 2023-06-24 PROCEDURE — 63600175 PHARM REV CODE 636 W HCPCS: Performed by: EMERGENCY MEDICINE

## 2023-06-24 PROCEDURE — 80143 DRUG ASSAY ACETAMINOPHEN: CPT | Performed by: NURSE PRACTITIONER

## 2023-06-24 PROCEDURE — 93010 EKG 12-LEAD: ICD-10-PCS | Mod: ,,, | Performed by: INTERNAL MEDICINE

## 2023-06-24 PROCEDURE — 25000003 PHARM REV CODE 250: Performed by: EMERGENCY MEDICINE

## 2023-06-24 PROCEDURE — 84484 ASSAY OF TROPONIN QUANT: CPT | Performed by: EMERGENCY MEDICINE

## 2023-06-24 PROCEDURE — 80179 DRUG ASSAY SALICYLATE: CPT | Performed by: NURSE PRACTITIONER

## 2023-06-24 PROCEDURE — 96375 TX/PRO/DX INJ NEW DRUG ADDON: CPT | Mod: 59

## 2023-06-24 PROCEDURE — 82077 ASSAY SPEC XCP UR&BREATH IA: CPT | Performed by: NURSE PRACTITIONER

## 2023-06-24 PROCEDURE — 80053 COMPREHEN METABOLIC PANEL: CPT | Performed by: EMERGENCY MEDICINE

## 2023-06-24 PROCEDURE — 81025 URINE PREGNANCY TEST: CPT | Performed by: NURSE PRACTITIONER

## 2023-06-24 PROCEDURE — 85025 COMPLETE CBC W/AUTO DIFF WBC: CPT | Performed by: NURSE PRACTITIONER

## 2023-06-24 PROCEDURE — 51701 INSERT BLADDER CATHETER: CPT

## 2023-06-24 PROCEDURE — 80307 DRUG TEST PRSMV CHEM ANLYZR: CPT | Performed by: NURSE PRACTITIONER

## 2023-06-24 PROCEDURE — 99285 EMERGENCY DEPT VISIT HI MDM: CPT | Mod: 25

## 2023-06-24 RX ORDER — METOCLOPRAMIDE HYDROCHLORIDE 5 MG/ML
20 INJECTION INTRAMUSCULAR; INTRAVENOUS
Status: COMPLETED | OUTPATIENT
Start: 2023-06-24 | End: 2023-06-24

## 2023-06-24 RX ORDER — ESCITALOPRAM OXALATE 10 MG/1
10 TABLET ORAL
COMMUNITY
Start: 2023-06-20 | End: 2023-09-18

## 2023-06-24 RX ORDER — PROMETHAZINE HYDROCHLORIDE 25 MG/1
25 TABLET ORAL EVERY 6 HOURS PRN
Qty: 15 TABLET | Refills: 0 | Status: SHIPPED | OUTPATIENT
Start: 2023-06-24

## 2023-06-24 RX ORDER — HALOPERIDOL 5 MG/ML
5 INJECTION INTRAMUSCULAR
Status: COMPLETED | OUTPATIENT
Start: 2023-06-24 | End: 2023-06-24

## 2023-06-24 RX ADMIN — METOCLOPRAMIDE 20 MG: 5 INJECTION, SOLUTION INTRAMUSCULAR; INTRAVENOUS at 11:06

## 2023-06-24 RX ADMIN — HALOPERIDOL LACTATE 5 MG: 5 INJECTION, SOLUTION INTRAMUSCULAR at 11:06

## 2023-06-24 RX ADMIN — PROMETHAZINE HYDROCHLORIDE 12.5 MG: 25 INJECTION INTRAMUSCULAR; INTRAVENOUS at 11:06

## 2023-06-24 NOTE — ED PROVIDER NOTES
SCRIBE #1 NOTE: I, Aniyah Ricardo, am scribing for, and in the presence of, Brittny Calixto MD. I have scribed the entire note.       History     Chief Complaint   Patient presents with    Anxiety     Seizure like activity per family.  History of anxiety and ovarian cyst     Review of patient's allergies indicates:  No Known Allergies      History of Present Illness     HPI    6/24/2023, 9:58 AM  History obtained from the mother and patient      History of Present Illness: Karyn Wells is a 22 y.o. female patient with a PMHx of SVT who presents to the Emergency Department for evaluation of abdominal pain and chest tightness. Pt's mother states pt has had chest tightness and labored breathing since 6/15.  Pt's mother states pt has a hisotry of panic attacks and had one today while working.  Pt was alerted by her PCP on Wednesday that her d-dimer was elevated and was told to got o ED. Pt's mother states a CT chest was done but came back negative.  Symptoms are constant and moderate in severity. No mitigating or exacerbating factors reported. Patient denies any diarrhea, and all other sxs at this time.  No further complaints or concerns at this time.       Arrival mode: Ambulance service    PCP: Primary Doctor No        Past Medical History:  Past Medical History:   Diagnosis Date    Acute conjunctivitis of left eye 1/1/2020    Decreased fetal movement 2/18/2020    Seizure     SVT (supraventricular tachycardia)        Past Surgical History:  No past surgical history on file.      Family History:  Family History   Problem Relation Age of Onset    Breast cancer Neg Hx     Colon cancer Neg Hx     Ovarian cancer Neg Hx     Thrombosis Neg Hx        Social History:  Social History     Tobacco Use    Smoking status: Never    Smokeless tobacco: Never   Substance and Sexual Activity    Alcohol use: No    Drug use: No    Sexual activity: Yes     Partners: Male     Birth control/protection: Implant        Review of  Systems     Review of Systems   Respiratory:  Positive for chest tightness.    Gastrointestinal:  Positive for abdominal pain. Negative for diarrhea.    Physical Exam     Initial Vitals [06/24/23 0927]   BP Pulse Resp Temp SpO2   114/71 68 (!) 28 98.1 °F (36.7 °C) 98 %      MAP       --          Physical Exam  Nursing Notes and Vital Signs Reviewed.  Constitutional: Patient is in no acute distress. Well-developed and well-nourished.  Head: Atraumatic. Normocephalic.  Eyes: PERRL. EOM intact. Conjunctivae are not pale. No scleral icterus.  ENT: Mucous membranes are moist. Oropharynx is clear and symmetric.    Neck: Supple. Full ROM. No lymphadenopathy.  Cardiovascular: Regular rate. Regular rhythm. No murmurs, rubs, or gallops. Distal pulses are 2+ and symmetric.  Pulmonary/Chest: No respiratory distress. Clear to auscultation bilaterally. No wheezing or rales.  Abdominal: Soft and non-distended. Generalized abdominal tenderness.  No rebound, guarding, or rigidity.   Genitourinary: No CVA tenderness  Musculoskeletal: Moves all extremities. No obvious deformities. No edema. No calf tenderness.  Skin: Warm and dry.  Neurological:  Somnolent.   Psychiatric: Normal affect. Good eye contact. Appropriate in content.     ED Course   Procedures  ED Vital Signs:  Vitals:    06/24/23 0927 06/24/23 0936 06/24/23 0945 06/24/23 1000   BP: 114/71   124/71   Pulse: 68  (!) 56 60   Resp: (!) 28   20   Temp: 98.1 °F (36.7 °C)      SpO2: 98%   98%   Weight:  69 kg (152 lb 1.9 oz)      06/24/23 1217   BP: 113/65   Pulse: 72   Resp: 20   Temp:    SpO2: 99%   Weight:        Abnormal Lab Results:  Labs Reviewed   DRUG SCREEN PANEL, URINE EMERGENCY - Abnormal; Notable for the following components:       Result Value    THC Presumptive Positive (*)     All other components within normal limits    Narrative:     Specimen Source->Urine   URINALYSIS, REFLEX TO URINE CULTURE - Abnormal; Notable for the following components:    Nitrite, UA  Positive (*)     All other components within normal limits    Narrative:     Specimen Source->Urine   ACETAMINOPHEN LEVEL - Abnormal; Notable for the following components:    Acetaminophen (Tylenol), Serum <3.0 (*)     All other components within normal limits   SALICYLATE LEVEL - Abnormal; Notable for the following components:    Salicylate Lvl <5.0 (*)     All other components within normal limits   COMPREHENSIVE METABOLIC PANEL - Abnormal; Notable for the following components:    CO2 19 (*)     ALT 9 (*)     All other components within normal limits   URINALYSIS MICROSCOPIC - Abnormal; Notable for the following components:    Bacteria Many (*)     All other components within normal limits    Narrative:     Specimen Source->Urine   CBC W/ AUTO DIFFERENTIAL   PREGNANCY TEST, URINE RAPID    Narrative:     Specimen Source->Urine   ALCOHOL,MEDICAL (ETHANOL)   C-REACTIVE PROTEIN   TROPONIN I   C-REACTIVE PROTEIN        All Lab Results:  Results for orders placed or performed during the hospital encounter of 06/24/23   CBC auto differential   Result Value Ref Range    WBC 9.06 3.90 - 12.70 K/uL    RBC 4.37 4.00 - 5.40 M/uL    Hemoglobin 13.2 12.0 - 16.0 g/dL    Hematocrit 38.7 37.0 - 48.5 %    MCV 89 82 - 98 fL    MCH 30.2 27.0 - 31.0 pg    MCHC 34.1 32.0 - 36.0 g/dL    RDW 12.1 11.5 - 14.5 %    Platelets 221 150 - 450 K/uL    MPV 11.1 9.2 - 12.9 fL    Immature Granulocytes 0.4 0.0 - 0.5 %    Gran # (ANC) 6.0 1.8 - 7.7 K/uL    Immature Grans (Abs) 0.04 0.00 - 0.04 K/uL    Lymph # 2.3 1.0 - 4.8 K/uL    Mono # 0.6 0.3 - 1.0 K/uL    Eos # 0.1 0.0 - 0.5 K/uL    Baso # 0.03 0.00 - 0.20 K/uL    nRBC 0 0 /100 WBC    Gran % 66.4 38.0 - 73.0 %    Lymph % 25.7 18.0 - 48.0 %    Mono % 6.4 4.0 - 15.0 %    Eosinophil % 0.8 0.0 - 8.0 %    Basophil % 0.3 0.0 - 1.9 %    Differential Method Automated    Drug screen panel, emergency   Result Value Ref Range    Benzodiazepines Negative Negative    Methadone metabolites Negative Negative     Cocaine (Metab.) Negative Negative    Opiate Scrn, Ur Negative Negative    Barbiturate Screen, Ur Negative Negative    Amphetamine Screen, Ur Negative Negative    THC Presumptive Positive (A) Negative    Phencyclidine Negative Negative    Creatinine, Urine 71.8 15.0 - 325.0 mg/dL    Toxicology Information SEE COMMENT    Urinalysis, Reflex to Urine Culture Urine, Catheterized    Specimen: Urine   Result Value Ref Range    Specimen UA Urine, Catheterized     Color, UA Yellow Yellow, Straw, Ayla    Appearance, UA Clear Clear    pH, UA 8.0 5.0 - 8.0    Specific Gravity, UA 1.010 1.005 - 1.030    Protein, UA Negative Negative    Glucose, UA Negative Negative    Ketones, UA Negative Negative    Bilirubin (UA) Negative Negative    Occult Blood UA Negative Negative    Nitrite, UA Positive (A) Negative    Urobilinogen, UA Negative <2.0 EU/dL    Leukocytes, UA Negative Negative   Pregnancy, urine rapid   Result Value Ref Range    Preg Test, Ur Negative    Ethanol   Result Value Ref Range    Alcohol, Serum <10 <10 mg/dL   Acetaminophen level   Result Value Ref Range    Acetaminophen (Tylenol), Serum <3.0 (L) 10.0 - 20.0 ug/mL   Salicylate Level   Result Value Ref Range    Salicylate Lvl <5.0 (L) 15.0 - 30.0 mg/dL   Comprehensive metabolic panel   Result Value Ref Range    Sodium 138 136 - 145 mmol/L    Potassium 3.7 3.5 - 5.1 mmol/L    Chloride 110 95 - 110 mmol/L    CO2 19 (L) 23 - 29 mmol/L    Glucose 104 70 - 110 mg/dL    BUN 10 6 - 20 mg/dL    Creatinine 0.8 0.5 - 1.4 mg/dL    Calcium 8.7 8.7 - 10.5 mg/dL    Total Protein 7.0 6.0 - 8.4 g/dL    Albumin 3.7 3.5 - 5.2 g/dL    Total Bilirubin 0.2 0.1 - 1.0 mg/dL    Alkaline Phosphatase 77 55 - 135 U/L    AST 13 10 - 40 U/L    ALT 9 (L) 10 - 44 U/L    eGFR >60 >60 mL/min/1.73 m^2    Anion Gap 9 8 - 16 mmol/L   Troponin I   Result Value Ref Range    Troponin I <0.006 0.000 - 0.026 ng/mL   C-reactive protein   Result Value Ref Range    CRP 1.6 0.0 - 8.2 mg/L   Urinalysis  Microscopic   Result Value Ref Range    Bacteria Many (A) None-Occ /hpf    Microscopic Comment SEE COMMENT          Imaging Results:  Imaging Results              CT Head Without Contrast (Final result)  Result time 06/24/23 10:18:32      Final result by Justin Pantoja MD (06/24/23 10:18:32)                   Impression:      1.  Study is limited by motion artifact on most of the images provided.  Subtle abnormalities could easily be overlooked.  Negative for obvious acute intracranial process. Negative for hemorrhage, or skull fracture.    All CT scans at this facility are performed  using dose modulation techniques as appropriate to performed exam including the following:  automated exposure control; adjustment of mA and/or kV according to the patients size (this includes techniques or standardized protocols for targeted exams where dose is matched to indication/reason for exam: i.e. extremities or head);  iterative reconstruction technique.      Electronically signed by: Justin Pantoja MD  Date:    06/24/2023  Time:    10:18               Narrative:    EXAMINATION:  CT HEAD WITHOUT CONTRAST    CLINICAL HISTORY:  Mental status change, unknown cause;    TECHNIQUE:  Axial images through the brain and posterior fossa were obtained without the use of IV contrast.  Sagittal and coronal reconstructions are provided for review.    COMPARISON:  October 31, 2018    FINDINGS:  Motion artifact is present on most of the images provided.  Subtle abnormalities could easily be overlooked.  The ventricles are midline and the CSF spaces are normal. The gray-white matter junction is well preserved. Negative for intracranial vascular abnormalities. Negative for mass, mass effect, cerebral edema, hemorrhage or abnormal fluid collections.    The skull and scalp are  intact.    The   paranasal sinuses, mastoid air cells, middle ears and ear canals are clear. The globes are intact.                                       The EKG was  ordered, reviewed, and independently interpreted by the ED provider.  Interpretation time: 9;33  Rate: 58 BPM  Rhythm: sinus bradycardia with sinus arrhythmia  Interpretation: No acute ST or T wave abnormalities. No STEMI.           The Emergency Provider reviewed the vital signs and test results, which are outlined above.     ED Discussion     11:44 AM: Reassessed pt at this time. Discussed with pt all pertinent ED information and results. Discussed pt dx and plan of tx. Gave pt all f/u and return to the ED instructions. All questions and concerns were addressed at this time. Pt expresses understanding of information and instructions, and is comfortable with plan to discharge. Pt is stable for discharge.    I discussed with patient and/or family/caretaker that evaluation in the ED does not suggest any emergent or life threatening medical conditions requiring immediate intervention beyond what was provided in the ED, and I believe patient is safe for discharge.  Regardless, an unremarkable evaluation in the ED does not preclude the development or presence of a serious of life threatening condition. As such, patient was instructed to return immediately for any worsening or change in current symptoms.         Medical Decision Making:   Clinical Tests:   Lab Tests: Ordered and Reviewed  Radiological Study: Ordered and Reviewed  Medical Tests: Ordered and Reviewed         ED Medication(s):  Medications   metoclopramide HCl injection 20 mg (20 mg Intravenous Given 6/24/23 1109)   haloperidol lactate injection 5 mg (5 mg Intravenous Given 6/24/23 1118)   promethazine (PHENERGAN) 12.5 mg in dextrose 5 % (D5W) 50 mL IVPB (0 mg Intravenous Stopped 6/24/23 1231)       Discharge Medication List as of 6/24/2023 11:43 AM        START taking these medications    Details   promethazine (PHENERGAN) 25 MG tablet Take 1 tablet (25 mg total) by mouth every 6 (six) hours as needed for Nausea., Starting Sat 6/24/2023, Print               Follow-up Information       [facility] In 2 days.    Contact information:  [address] [facility] [address] [phone]               [facility] - Emergency Dept..    Specialty: Emergency Medicine  Why: As needed, If symptoms worsen  Contact information:  [address] [facility] [address] [phone]                               Scribe Attestation:   Scribe #1: I performed the above scribed service and the documentation accurately describes the services I performed. I attest to the accuracy of the note.     Attending:   Physician Attestation Statement for Scribe #1: I, Brittny Calixto MD, personally performed the services described in this documentation, as scribed by Aniyah Ricardo, in my presence, and it is both accurate and complete.           Clinical Impression       ICD-10-CM ICD-9-CM   1. Cannabinoid hyperemesis syndrome  R11.2 536.2    F12.90 305.20   2. Altered mental status  R41.82 780.97       Disposition:   Disposition: Discharged  Condition: Stable       Brittny Calixto MD  06/24/23 1432

## 2023-06-24 NOTE — FIRST PROVIDER EVALUATION
Medical screening examination initiated.  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    There were no vitals filed for this visit.    Pertinent physical exam:  Patient obtunded on EMS stretcher    Brief workup plan:  Altered mental status workup    Preliminary workup initiated; this workup will be continued and followed by the physician or advanced practice provider that is assigned to the patient when roomed.

## 2024-04-18 ENCOUNTER — HOSPITAL ENCOUNTER (EMERGENCY)
Facility: HOSPITAL | Age: 24
Discharge: HOME | End: 2024-04-19
Attending: EMERGENCY MEDICINE
Payer: MEDICAID

## 2024-04-18 DIAGNOSIS — R11.2 NAUSEA & VOMITING: ICD-10-CM

## 2024-04-18 DIAGNOSIS — R11.10 HYPEREMESIS: Primary | ICD-10-CM

## 2024-04-18 LAB
ALBUMIN SERPL BCP-MCNC: 4.5 G/DL (ref 3.5–5.2)
ALP SERPL-CCNC: 73 U/L (ref 55–135)
ALT SERPL W/O P-5'-P-CCNC: 87 U/L (ref 10–44)
AMPHET+METHAMPHET UR QL: NEGATIVE
ANION GAP SERPL CALC-SCNC: 19 MMOL/L (ref 8–16)
AST SERPL-CCNC: 143 U/L (ref 10–40)
B-HCG UR QL: NEGATIVE
BARBITURATES UR QL SCN>200 NG/ML: NEGATIVE
BASOPHILS # BLD AUTO: 0.03 K/UL (ref 0–0.2)
BASOPHILS NFR BLD: 0.3 % (ref 0–1.9)
BENZODIAZ UR QL SCN>200 NG/ML: NEGATIVE
BILIRUB SERPL-MCNC: 1.8 MG/DL (ref 0.1–1)
BILIRUB UR QL STRIP: ABNORMAL
BUN SERPL-MCNC: 14 MG/DL (ref 6–20)
BZE UR QL SCN: NEGATIVE
CALCIUM SERPL-MCNC: 9.5 MG/DL (ref 8.7–10.5)
CANNABINOIDS UR QL SCN: ABNORMAL
CHLORIDE SERPL-SCNC: 105 MMOL/L (ref 95–110)
CLARITY UR REFRACT.AUTO: CLEAR
CO2 SERPL-SCNC: 16 MMOL/L (ref 23–29)
COLOR UR AUTO: YELLOW
CREAT SERPL-MCNC: 0.9 MG/DL (ref 0.5–1.4)
CREAT UR-MCNC: 143.4 MG/DL (ref 15–325)
DIFFERENTIAL METHOD BLD: ABNORMAL
EOSINOPHIL # BLD AUTO: 0 K/UL (ref 0–0.5)
EOSINOPHIL NFR BLD: 0 % (ref 0–8)
ERYTHROCYTE [DISTWIDTH] IN BLOOD BY AUTOMATED COUNT: 13 % (ref 11.5–14.5)
EST. GFR  (NO RACE VARIABLE): >60 ML/MIN/1.73 M^2
GLUCOSE SERPL-MCNC: 144 MG/DL (ref 70–110)
GLUCOSE UR QL STRIP: NEGATIVE
HCT VFR BLD AUTO: 44.3 % (ref 37–48.5)
HGB BLD-MCNC: 15.2 G/DL (ref 12–16)
HGB UR QL STRIP: NEGATIVE
IMM GRANULOCYTES # BLD AUTO: 0.04 K/UL (ref 0–0.04)
IMM GRANULOCYTES NFR BLD AUTO: 0.4 % (ref 0–0.5)
KETONES UR QL STRIP: ABNORMAL
LEUKOCYTE ESTERASE UR QL STRIP: NEGATIVE
LIPASE SERPL-CCNC: 78 U/L (ref 4–60)
LYMPHOCYTES # BLD AUTO: 0.5 K/UL (ref 1–4.8)
LYMPHOCYTES NFR BLD: 4.2 % (ref 18–48)
MCH RBC QN AUTO: 31 PG (ref 27–31)
MCHC RBC AUTO-ENTMCNC: 34.3 G/DL (ref 32–36)
MCV RBC AUTO: 90 FL (ref 82–98)
METHADONE UR QL SCN>300 NG/ML: NEGATIVE
MONOCYTES # BLD AUTO: 0.4 K/UL (ref 0.3–1)
MONOCYTES NFR BLD: 3.4 % (ref 4–15)
NEUTROPHILS # BLD AUTO: 10.5 K/UL (ref 1.8–7.7)
NEUTROPHILS NFR BLD: 91.7 % (ref 38–73)
NITRITE UR QL STRIP: NEGATIVE
NRBC BLD-RTO: 0 /100 WBC
OPIATES UR QL SCN: NEGATIVE
PCP UR QL SCN>25 NG/ML: NEGATIVE
PH UR STRIP: 6 [PH] (ref 5–8)
PLATELET # BLD AUTO: 236 K/UL (ref 150–450)
PMV BLD AUTO: 11 FL (ref 9.2–12.9)
POTASSIUM SERPL-SCNC: 3.6 MMOL/L (ref 3.5–5.1)
PROT SERPL-MCNC: 7.8 G/DL (ref 6–8.4)
PROT UR QL STRIP: NEGATIVE
RBC # BLD AUTO: 4.9 M/UL (ref 4–5.4)
SODIUM SERPL-SCNC: 140 MMOL/L (ref 136–145)
SP GR UR STRIP: 1.02 (ref 1–1.03)
TOXICOLOGY INFORMATION: ABNORMAL
URN SPEC COLLECT METH UR: ABNORMAL
UROBILINOGEN UR STRIP-ACNC: >=8 EU/DL
WBC # BLD AUTO: 11.41 K/UL (ref 3.9–12.7)

## 2024-04-18 PROCEDURE — 81025 URINE PREGNANCY TEST: CPT | Mod: ER | Performed by: EMERGENCY MEDICINE

## 2024-04-18 PROCEDURE — 96374 THER/PROPH/DIAG INJ IV PUSH: CPT | Mod: 59,ER

## 2024-04-18 PROCEDURE — 81003 URINALYSIS AUTO W/O SCOPE: CPT | Mod: ER,59 | Performed by: EMERGENCY MEDICINE

## 2024-04-18 PROCEDURE — 93005 ELECTROCARDIOGRAM TRACING: CPT | Mod: ER

## 2024-04-18 PROCEDURE — 80053 COMPREHEN METABOLIC PANEL: CPT | Mod: ER | Performed by: EMERGENCY MEDICINE

## 2024-04-18 PROCEDURE — 99285 EMERGENCY DEPT VISIT HI MDM: CPT | Mod: 25,ER

## 2024-04-18 PROCEDURE — 96375 TX/PRO/DX INJ NEW DRUG ADDON: CPT | Mod: ER

## 2024-04-18 PROCEDURE — 83690 ASSAY OF LIPASE: CPT | Mod: ER | Performed by: EMERGENCY MEDICINE

## 2024-04-18 PROCEDURE — 63600175 PHARM REV CODE 636 W HCPCS: Mod: ER | Performed by: EMERGENCY MEDICINE

## 2024-04-18 PROCEDURE — 25500020 PHARM REV CODE 255: Mod: ER | Performed by: EMERGENCY MEDICINE

## 2024-04-18 PROCEDURE — 80307 DRUG TEST PRSMV CHEM ANLYZR: CPT | Mod: ER | Performed by: EMERGENCY MEDICINE

## 2024-04-18 PROCEDURE — 85025 COMPLETE CBC W/AUTO DIFF WBC: CPT | Mod: ER | Performed by: EMERGENCY MEDICINE

## 2024-04-18 PROCEDURE — 96361 HYDRATE IV INFUSION ADD-ON: CPT | Mod: ER

## 2024-04-18 PROCEDURE — 93010 ELECTROCARDIOGRAM REPORT: CPT | Mod: ,,, | Performed by: INTERNAL MEDICINE

## 2024-04-18 RX ORDER — HALOPERIDOL 5 MG/ML
1 INJECTION INTRAMUSCULAR
Status: COMPLETED | OUTPATIENT
Start: 2024-04-18 | End: 2024-04-18

## 2024-04-18 RX ORDER — ONDANSETRON 4 MG/1
4 TABLET, ORALLY DISINTEGRATING ORAL EVERY 8 HOURS PRN
Qty: 11 TABLET | Refills: 0 | Status: SHIPPED | OUTPATIENT
Start: 2024-04-18 | End: 2024-04-21

## 2024-04-18 RX ORDER — DIPHENHYDRAMINE HYDROCHLORIDE 50 MG/ML
12.5 INJECTION INTRAMUSCULAR; INTRAVENOUS
Status: COMPLETED | OUTPATIENT
Start: 2024-04-18 | End: 2024-04-18

## 2024-04-18 RX ORDER — METOCLOPRAMIDE HYDROCHLORIDE 5 MG/ML
10 INJECTION INTRAMUSCULAR; INTRAVENOUS
Status: DISCONTINUED | OUTPATIENT
Start: 2024-04-18 | End: 2024-04-18

## 2024-04-18 RX ORDER — DIPHENHYDRAMINE HYDROCHLORIDE 50 MG/ML
12.5 INJECTION INTRAMUSCULAR; INTRAVENOUS
Status: DISCONTINUED | OUTPATIENT
Start: 2024-04-18 | End: 2024-04-18

## 2024-04-18 RX ADMIN — SODIUM CHLORIDE, POTASSIUM CHLORIDE, SODIUM LACTATE AND CALCIUM CHLORIDE 1000 ML: 600; 310; 30; 20 INJECTION, SOLUTION INTRAVENOUS at 11:04

## 2024-04-18 RX ADMIN — IOHEXOL 75 ML: 350 INJECTION, SOLUTION INTRAVENOUS at 09:04

## 2024-04-18 RX ADMIN — SODIUM CHLORIDE, POTASSIUM CHLORIDE, SODIUM LACTATE AND CALCIUM CHLORIDE 1000 ML: 600; 310; 30; 20 INJECTION, SOLUTION INTRAVENOUS at 07:04

## 2024-04-18 RX ADMIN — HALOPERIDOL LACTATE 1 MG: 5 INJECTION, SOLUTION INTRAMUSCULAR at 07:04

## 2024-04-18 RX ADMIN — DIPHENHYDRAMINE HYDROCHLORIDE 12.5 MG: 50 INJECTION INTRAMUSCULAR; INTRAVENOUS at 07:04

## 2024-04-18 NOTE — Clinical Note
"Karyn Oliveira" Priscilla was seen and treated in our emergency department on 4/18/2024.  She may return to work on 04/22/2024.       If you have any questions or concerns, please don't hesitate to call.      Santino Snider MD"

## 2024-04-19 VITALS
WEIGHT: 135 LBS | RESPIRATION RATE: 20 BRPM | TEMPERATURE: 99 F | BODY MASS INDEX: 22.47 KG/M2 | SYSTOLIC BLOOD PRESSURE: 133 MMHG | HEART RATE: 108 BPM | DIASTOLIC BLOOD PRESSURE: 65 MMHG | OXYGEN SATURATION: 100 %

## 2024-04-19 LAB
ALBUMIN SERPL BCP-MCNC: 3.6 G/DL (ref 3.5–5.2)
ALP SERPL-CCNC: 60 U/L (ref 55–135)
ALT SERPL W/O P-5'-P-CCNC: 91 U/L (ref 10–44)
ANION GAP SERPL CALC-SCNC: 14 MMOL/L (ref 8–16)
AST SERPL-CCNC: 109 U/L (ref 10–40)
BILIRUB SERPL-MCNC: 1 MG/DL (ref 0.1–1)
BUN SERPL-MCNC: 10 MG/DL (ref 6–20)
CALCIUM SERPL-MCNC: 8.5 MG/DL (ref 8.7–10.5)
CHLORIDE SERPL-SCNC: 106 MMOL/L (ref 95–110)
CO2 SERPL-SCNC: 18 MMOL/L (ref 23–29)
CREAT SERPL-MCNC: 0.7 MG/DL (ref 0.5–1.4)
EST. GFR  (NO RACE VARIABLE): >60 ML/MIN/1.73 M^2
GLUCOSE SERPL-MCNC: 102 MG/DL (ref 70–110)
OHS QRS DURATION: 72 MS
OHS QTC CALCULATION: 467 MS
POTASSIUM SERPL-SCNC: 3.7 MMOL/L (ref 3.5–5.1)
PROT SERPL-MCNC: 6.3 G/DL (ref 6–8.4)
SODIUM SERPL-SCNC: 138 MMOL/L (ref 136–145)

## 2024-04-19 PROCEDURE — 96361 HYDRATE IV INFUSION ADD-ON: CPT | Mod: ER

## 2024-04-19 PROCEDURE — 63600175 PHARM REV CODE 636 W HCPCS: Mod: ER | Performed by: EMERGENCY MEDICINE

## 2024-04-19 PROCEDURE — 80053 COMPREHEN METABOLIC PANEL: CPT | Mod: ER | Performed by: EMERGENCY MEDICINE

## 2024-04-19 RX ORDER — ONDANSETRON HYDROCHLORIDE 2 MG/ML
4 INJECTION, SOLUTION INTRAVENOUS
Status: COMPLETED | OUTPATIENT
Start: 2024-04-19 | End: 2024-04-19

## 2024-04-19 RX ORDER — KETOROLAC TROMETHAMINE 30 MG/ML
15 INJECTION, SOLUTION INTRAMUSCULAR; INTRAVENOUS
Status: COMPLETED | OUTPATIENT
Start: 2024-04-19 | End: 2024-04-19

## 2024-04-19 RX ADMIN — ONDANSETRON 4 MG: 2 INJECTION INTRAMUSCULAR; INTRAVENOUS at 01:04

## 2024-04-19 RX ADMIN — KETOROLAC TROMETHAMINE 15 MG: 30 INJECTION, SOLUTION INTRAMUSCULAR at 01:04

## 2024-04-19 NOTE — ED PROVIDER NOTES
Encounter Date: 4/18/2024       History     Chief Complaint   Patient presents with    Emesis     Pt comes in POV with c/o abdominal pain, vomiting, nausea, diarrhea x36 hours. 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  Patient has unclear with the diarrhea looks like, but says the vomiting is green at this point.  Patient has been having near continuous symptoms over the past 2 days.  She is also reporting diffuse abdominal pain.  She attempted to take Zofran prior to arrival, but keeps vomiting it up.    The history is provided by the patient.     Review of patient's allergies indicates:  No Known Allergies  Past Medical History:   Diagnosis Date    Acute conjunctivitis of left eye 1/1/2020    Decreased fetal movement 2/18/2020    Seizure     SVT (supraventricular tachycardia)      No past surgical history on file.  Family History   Problem Relation Name Age of Onset    Breast cancer Neg Hx      Colon cancer Neg Hx      Ovarian cancer Neg Hx      Thrombosis Neg Hx       Social History     Tobacco Use    Smoking status: Never    Smokeless tobacco: Never   Substance Use Topics    Alcohol use: No    Drug use: No     Review of Systems   Constitutional:  Negative for diaphoresis and fever.   HENT:  Negative for congestion, dental problem and sore throat.    Eyes:  Negative for pain and visual disturbance.   Respiratory:  Negative for cough and shortness of breath.    Cardiovascular:  Negative for chest pain and palpitations.   Gastrointestinal:  Positive for abdominal pain, diarrhea, nausea and vomiting.   Genitourinary:  Negative for dysuria and flank pain.   Musculoskeletal:  Negative for back pain and neck pain.   Skin:  Negative for rash and wound.   Neurological:  Negative for weakness, numbness and headaches.    Psychiatric/Behavioral:  Negative for agitation and confusion.        Physical Exam     Initial Vitals [04/18/24 1913]   BP Pulse Resp Temp SpO2   119/71 108 (!) 30 98.2 °F (36.8 °C) 100 %      MAP       --         Physical Exam    Constitutional: She appears well-developed and well-nourished.   HENT:   Head: Normocephalic and atraumatic.   Dry MM   Eyes: EOM are normal. Pupils are equal, round, and reactive to light.   Neck: Neck supple.   Normal range of motion.  Cardiovascular:  Regular rhythm.           Tachycardia   Pulmonary/Chest: Breath sounds normal. No respiratory distress.   Abdominal:   Patient has diffuse tenderness.    Musculoskeletal:         General: No tenderness or edema. Normal range of motion.      Cervical back: Normal range of motion and neck supple.     Neurological: She is alert and oriented to person, place, and time. She has normal strength. No sensory deficit.   Skin: Skin is warm and dry.   Psychiatric: She has a normal mood and affect.         ED Course   Procedures  Labs Reviewed   CBC W/ AUTO DIFFERENTIAL - Abnormal; Notable for the following components:       Result Value    Gran # (ANC) 10.5 (*)     Lymph # 0.5 (*)     Gran % 91.7 (*)     Lymph % 4.2 (*)     Mono % 3.4 (*)     All other components within normal limits   COMPREHENSIVE METABOLIC PANEL - Abnormal; Notable for the following components:    CO2 16 (*)     Glucose 144 (*)     Total Bilirubin 1.8 (*)      (*)     ALT 87 (*)     Anion Gap 19 (*)     All other components within normal limits   LIPASE - Abnormal; Notable for the following components:    Lipase 78 (*)     All other components within normal limits   URINALYSIS, REFLEX TO URINE CULTURE - Abnormal; Notable for the following components:    Ketones, UA 3+ (*)     Bilirubin (UA) 1+ (*)     Urobilinogen, UA >=8.0 (*)     All other components within normal limits    Narrative:     Specimen Source->Urine   DRUG SCREEN PANEL, URINE EMERGENCY - Abnormal; Notable  for the following components:    THC Presumptive Positive (*)     All other components within normal limits    Narrative:     Specimen Source->Urine   COMPREHENSIVE METABOLIC PANEL - Abnormal; Notable for the following components:    CO2 18 (*)     Calcium 8.5 (*)      (*)     ALT 91 (*)     All other components within normal limits   PREGNANCY TEST, URINE RAPID    Narrative:     Specimen Source->Urine     EKG Readings: (Independently Interpreted)   Rate of 88 beats per minute.  Normal sinus rhythm with sinus arrhythmia.  Normal axis.  P.r., QRS and QTC intervals within normal limits.  No STEMI.       Imaging Results              CT Abdomen Pelvis With IV Contrast NO Oral Contrast (Final result)  Result time 04/18/24 22:36:42      Final result by Marina Sarmiento MD (04/18/24 22:36:42)                   Impression:      No overt acute abdominopelvic finding      Electronically signed by: Marina Sarmiento  Date:    04/18/2024  Time:    22:36               Narrative:    EXAMINATION:  CT ABDOMEN PELVIS WITH IV CONTRAST    CLINICAL HISTORY:  Epigastric pain;Nausea/vomiting;Elevated bili;    TECHNIQUE:  Low dose axial images, sagittal and coronal reformations were obtained from the lung bases to the pubic symphysis before and following the IV administration of 75 mL of Omnipaque 350 iterative technique automated exposure control.    COMPARISON:  None    FINDINGS:  Liver and spleen normal size.  Gallbladder absent.  Postoperative biliary ducts minimally prominent.    Pancreas unremarkable    Kidneys without hydronephrosis    Urinary bladder decompressed    No signs of appendicitis or obstructive bowel findings.  Small bowel and proximal colon contains fluid                                       Medications   lactated ringers bolus 1,000 mL (0 mLs Intravenous Stopped 4/18/24 2111)   haloperidol lactate injection 1 mg (1 mg Intravenous Given 4/18/24 1952)   diphenhydrAMINE injection 12.5 mg (12.5 mg Intravenous  Given 4/18/24 1952)   iohexoL (OMNIPAQUE 350) injection 75 mL (75 mLs Intravenous Given 4/18/24 2150)   lactated ringers bolus 1,000 mL (0 mLs Intravenous Stopped 4/19/24 0102)   ketorolac injection 15 mg (15 mg Intravenous Given 4/19/24 0105)   ondansetron injection 4 mg (4 mg Intravenous Given 4/19/24 0104)     Medical Decision Making  Differential diagnosis includes hyperemesis, gastroenteritis, pancreatitis, reflux, marijuana use    Problems Addressed:  Hyperemesis: acute illness or injury    Amount and/or Complexity of Data Reviewed  Labs: ordered. Decision-making details documented in ED Course.  Radiology: ordered. Decision-making details documented in ED Course.  ECG/medicine tests: ordered and independent interpretation performed. Decision-making details documented in ED Course.    Risk  Prescription drug management.               ED Course as of 04/19/24 0242 Fri Apr 19, 2024   0053 12:53 AM Reassessment: I reassessed the pt.  The pt is resting comfortably and is NAD.  Pt states their sx have improved. Discussed test results, shared treatment plan, specific conditions for return, and the need for f/u.  Answered their questions at this time.  Pt understands and agrees to the plan.  The pt has remained hemodynamically stable through ED course and is stable for discharge.    [BA]      ED Course User Index  [BA] Santino Snider MD                           Clinical Impression:  Final diagnoses:  [R11.2] Nausea & vomiting  [R11.10] Hyperemesis (Primary)          ED Disposition Condition    Discharge           ED Prescriptions       Medication Sig Dispense Start Date End Date Auth. Provider    ondansetron (ZOFRAN-ODT) 4 MG TbDL Take 1 tablet (4 mg total) by mouth every 8 (eight) hours as needed. 11 tablet 4/18/2024 4/21/2024 Santino Snider MD          Follow-up Information       Follow up With Specialties Details Why Contact Info    [facility] - Emergency Dept Emergency Medicine Go today If symptoms worsen,  For re-evaluation and further treatment, As needed [id number] [facility] [address]  [place] [id number] [phone]    Your Primary Care Provider  Schedule an appointment as soon as possible for a visit in 2 days For re-evaluation and further treatment              Santino Snider MD  04/19/24 [id number]